# Patient Record
Sex: MALE | Race: WHITE | Employment: FULL TIME | URBAN - METROPOLITAN AREA
[De-identification: names, ages, dates, MRNs, and addresses within clinical notes are randomized per-mention and may not be internally consistent; named-entity substitution may affect disease eponyms.]

---

## 2017-01-06 RX ORDER — INSULIN GLARGINE 100 [IU]/ML
INJECTION, SOLUTION SUBCUTANEOUS
Qty: 2 EACH | Refills: 3 | Status: SHIPPED | OUTPATIENT
Start: 2017-01-06 | End: 2017-08-31 | Stop reason: SDUPTHER

## 2017-02-26 RX ORDER — BENAZEPRIL HYDROCHLORIDE 10 MG/1
TABLET ORAL
Qty: 90 TAB | Refills: 1 | Status: SHIPPED | OUTPATIENT
Start: 2017-02-26 | End: 2017-08-31 | Stop reason: SDUPTHER

## 2017-02-27 RX ORDER — BLOOD SUGAR DIAGNOSTIC
STRIP MISCELLANEOUS
Qty: 500 STRIP | Refills: 1 | Status: SHIPPED | OUTPATIENT
Start: 2017-02-27 | End: 2018-01-23 | Stop reason: SDUPTHER

## 2017-03-27 ENCOUNTER — OFFICE VISIT (OUTPATIENT)
Dept: INTERNAL MEDICINE CLINIC | Age: 42
End: 2017-03-27

## 2017-03-27 VITALS
DIASTOLIC BLOOD PRESSURE: 76 MMHG | BODY MASS INDEX: 38.92 KG/M2 | HEART RATE: 86 BPM | RESPIRATION RATE: 19 BRPM | HEIGHT: 67 IN | TEMPERATURE: 98.3 F | WEIGHT: 248 LBS | SYSTOLIC BLOOD PRESSURE: 138 MMHG | OXYGEN SATURATION: 98 %

## 2017-03-27 DIAGNOSIS — M25.571 ACUTE RIGHT ANKLE PAIN: ICD-10-CM

## 2017-03-27 RX ORDER — BISMUTH SUBSALICYLATE 262 MG
1 TABLET,CHEWABLE ORAL DAILY
COMMUNITY

## 2017-03-27 NOTE — MR AVS SNAPSHOT
Visit Information Date & Time Provider Department Dept. Phone Encounter #  
 3/27/2017  9:00 AM [facility]  Upcoming Health Maintenance Date Due HEMOGLOBIN A1C Q6M 6/27/2017 LIPID PANEL Q1 9/22/2017 FOOT EXAM Q1 12/9/2017 MICROALBUMIN Q1 12/27/2017 EYE EXAM RETINAL OR DILATED Q1 1/20/2018 DTaP/Tdap/Td series (2 - Td) 5/5/2018 Allergies as of 3/27/2017  Review Complete On: 3/27/2017 By: Leandrew Dakins, MD  
  
 Severity Noted Reaction Type Reactions Metformin  08/15/2014   Intolerance Nausea Only Current Immunizations  Reviewed on 9/4/2015 Name Date Influenza Vaccine (Quad) PF 9/4/2015, 12/23/2014 Influenza Vaccine PF 2/5/2014 Influenza Vaccine Split 10/23/2012, 10/21/2011, 11/1/2010 Influenza Vaccine Whole 10/30/2009, 10/24/2008 Pneumococcal Vaccine (Unspecified Type) 5/5/2008 TDAP Vaccine 5/5/2008 Not reviewed this visit You Were Diagnosed With   
  
 Codes Comments Uncontrolled type 2 diabetes mellitus without complication, with long-term current use of insulin ([facility] [address]    -  Primary ICD-10-CM: E11.65, Z79.4 ICD-9-CM: 250.02, V58.67 Acute right ankle pain     ICD-10-CM: M25.571 ICD-9-CM: 719.47, 338.19 Vitals BP Pulse Temp Resp Height(growth percentile) Weight(growth percentile) 138/76 (BP 1 Location: Right arm, BP Patient Position: Sitting) 86 98.3 °F (36.8 °C) (Oral) 19 5' 6.5\" (1.689 m) 248 lb (112.5 kg) SpO2 BMI Smoking Status 98% 39.43 kg/m2 Never Smoker BMI and BSA Data Body Mass Index Body Surface Area  
 39.43 kg/m 2 2.3 m 2 [id number] [address] [phone] Your Updated Medication List  
  
   
This list is accurate as of: 3/27/17  9:47 AM.  Always use your most recent med list.  
  
  
  
  
 atorvastatin 40 mg tablet Commonly known as:  LIPITOR  
TAKE 1 TABLET BY MOUTH EVERY DAY  
  
 benazepril 10 mg tablet Commonly known as:  LOTENSIN  
TAKE 1 TABLET BY MOUTH EVERY DAY  
  
 CLARITIN-D 24 HOUR PO Take  by mouth. insulin glargine 100 unit/mL (3 mL) pen Commonly known as:  BASAGLAR KWIKPEN  
52 Units subcutaneously daily  
  
 montelukast 10 mg tablet Commonly known as:  SINGULAIR  
TAKE 1 TABLET BY MOUTH EVERY DAY  
  
 multivitamin tablet Commonly known as:  ONE A DAY Take 1 Tab by mouth daily. NOVOFINE 30 30 gauge x 1/3\" Generic drug:  Insulin Needles (Disposable) USE TO INJECT INSULIN 4 TIMES DAILY. NovoLOG Flexpen 100 unit/mL Inpn Generic drug:  insulin aspart INJECT UP TO 8 UNITS 3 TIMES A DAY AS DIRECTED  
  
 ONETOUCH ULTRA TEST strip Generic drug:  glucose blood VI test strips TEST 3-5 TIMES DAILY AS DIRECTED PEPCID PO Take  by mouth. VITAMIN D3 1,000 unit tablet Generic drug:  cholecalciferol Take  by mouth daily. We Performed the Following HEMOGLOBIN A1C WITH EAG [72940 CPT(R)] Patient Instructions Ankle: Exercises Your Care Instructions Here are some examples of exercises for your ankle. Start each exercise slowly. Ease off the exercise if you start to have pain. Your doctor or physical therapist will tell you when you can start these exercises and which ones will work best for you. How to do the exercises \"Alphabet\" exercise 1. Trace the alphabet with your toe. This helps your ankle move in all directions. Side-to-side knee swing exercise 1. Sit in a chair with your foot flat on the floor. 2. Slowly move your knee from side to side while keeping your foot pressed flat. 3. Continue this exercise for 2 to 3 minutes. Towel curl 1. While sitting, place your foot on a towel on the floor and scrunch the towel toward you with your toes. 2. Then use your toes to push the towel away from you. 3. Make this exercise more challenging by placing a weighted object, such as a soup can, on the other end of the towel. Towel stretch 1. Sit with your legs extended and knees straight. 2. Place a towel around your foot just under the toes. 3. Hold each end of the towel in each hand, with your hands above your knees. 4. Pull back with the towel so that your foot stretches toward you. 5. Hold the position for at least 15 to 30 seconds. 6. Repeat 2 to 4 times a session, up to 5 sessions a day. Ankle eversion exercise 1. Start by sitting with your foot flat on the floor and pushing it outward against an immovable object such as the wall or heavy furniture. Hold for about 6 seconds, then relax. Repeat 8 to 12 times. 2. After you feel comfortable with this, try using rubber tubing looped around the outside of your feet for resistance. Push your foot out to the side against the tubing, and then count to 10 as you slowly bring your foot back to the middle. Repeat 8 to 12 times. Isometric opposition exercises 1. While sitting, put your feet together flat on the floor. 2. Press your injured foot inward against your other foot. Hold for about 6 seconds, and relax. Repeat 8 to 12 times. 3. Then place the heel of your other foot on top of the injured one. Push down with the top heel while trying to push up with your injured foot. Hold for about 6 seconds, and relax. Repeat 8 to 12 times. Follow-up care is a key part of your treatment and safety. Be sure to make and go to all appointments, and call your doctor if you are having problems. It's also a good idea to know your test results and keep a list of the medicines you take. Where can you learn more? Go to http://adele-keysha.info/. Enter M284 in the search box to learn more about \"Ankle: Exercises. \" Current as of: May 23, 2016 Content Version: 11.1 © 7271-8291 Healthwise, Incorporated. Care instructions adapted under license by [employer] (which disclaims liability or warranty for this information). If you have questions about a medical condition or this instruction, always ask your healthcare professional. [address] any warranty or liability for your use of this information. Introducing [facility] & HEALTH SERVICES! Dear Amadeo Aguilera: 
Thank you for requesting a [employer] account. Our records indicate that you already have an active [employer] account. You can access your account anytime at [email or web address] Did you know that you can access your hospital and ER discharge instructions at any time in [employer]? You can also review all of your test results from your hospital stay or ER visit. Additional Information If you have questions, please visit the Frequently Asked Questions section of the [employer] website at [email or web address] Remember, [employer] is NOT to be used for urgent needs. For medical emergencies, dial 911. Now available from your iPhone and Android! Please provide this summary of care documentation to your next provider. Your primary care clinician is listed as Antwan Styles. If you have any questions after today's visit, please call [phone].

## 2017-03-27 NOTE — PATIENT INSTRUCTIONS
Ankle: Exercises  Your Care Instructions  Here are some examples of exercises for your ankle. Start each exercise slowly. Ease off the exercise if you start to have pain. Your doctor or physical therapist will tell you when you can start these exercises and which ones will work best for you. How to do the exercises  \"Alphabet\" exercise    1. Trace the alphabet with your toe. This helps your ankle move in all directions. Side-to-side knee swing exercise    1. Sit in a chair with your foot flat on the floor. 2. Slowly move your knee from side to side while keeping your foot pressed flat. 3. Continue this exercise for 2 to 3 minutes. Towel curl    1. While sitting, place your foot on a towel on the floor and scrunch the towel toward you with your toes. 2. Then use your toes to push the towel away from you. 3. Make this exercise more challenging by placing a weighted object, such as a soup can, on the other end of the towel. Towel stretch    1. Sit with your legs extended and knees straight. 2. Place a towel around your foot just under the toes. 3. Hold each end of the towel in each hand, with your hands above your knees. 4. Pull back with the towel so that your foot stretches toward you. 5. Hold the position for at least 15 to 30 seconds. 6. Repeat 2 to 4 times a session, up to 5 sessions a day. Ankle eversion exercise    1. Start by sitting with your foot flat on the floor and pushing it outward against an immovable object such as the wall or heavy furniture. Hold for about 6 seconds, then relax. Repeat 8 to 12 times. 2. After you feel comfortable with this, try using rubber tubing looped around the outside of your feet for resistance. Push your foot out to the side against the tubing, and then count to 10 as you slowly bring your foot back to the middle. Repeat 8 to 12 times. Isometric opposition exercises    1. While sitting, put your feet together flat on the floor.   2. Press your injured foot inward against your other foot. Hold for about 6 seconds, and relax. Repeat 8 to 12 times. 3. Then place the heel of your other foot on top of the injured one. Push down with the top heel while trying to push up with your injured foot. Hold for about 6 seconds, and relax. Repeat 8 to 12 times. Follow-up care is a key part of your treatment and safety. Be sure to make and go to all appointments, and call your doctor if you are having problems. It's also a good idea to know your test results and keep a list of the medicines you take. Where can you learn more? Go to http://adele-keysha.info/. Enter P287 in the search box to learn more about \"Ankle: Exercises. \"  Current as of: May 23, 2016  Content Version: 11.1  © 5986-0833 [employer]. Care instructions adapted under license by [employer] (which disclaims liability or warranty for this information). If you have questions about a medical condition or this instruction, always ask your healthcare professional. [address] any warranty or liability for your use of this information.

## 2017-03-27 NOTE — PROGRESS NOTES
Follow Up Visit    Alfredito Tamayo is a 42 y.o. male. he presents for Diabetes and Ankle Pain  . Endocrine Review  He is seen for diabetes. Since last visit: he has been using Basalglar with good results. Home glucose monitoring: is performed regularly, fasting values range 160-180. He reports medication compliance: compliant all of the time. He reports the following medication side effects: none. Diabetic diet compliance: compliant most of the time. Further diabetic ROS: no unusual visual symptoms, no hypoglycemia. Lab review: most recent A1c was 8.2. Will recheck this. Ankle Pain  Patient complains of right ankle pain. Onset of the symptoms was 3 days ago. Inciting event: injured while running. Current symptoms include stiffness and noted to be mild. Aggravating symptoms: running. Patient's overall course: gradually improving. Patient has had no prior ankle problems. Previous visits for this problem: none. Evaluation to date: none. Treatment to date: OTC analgesics: effective. Patient Active Problem List   Diagnosis Code    [facility] L54.51    Hypertension, essential, benign I10    Hypercholesteremia E78.00    Allergic rhinitis J30.9    GERD (gastroesophageal reflux disease) K21.9         Prior to Admission medications    Medication Sig Start Date End Date Taking? Authorizing Provider   multivitamin (ONE A DAY) tablet Take 1 Tab by mouth daily. Yes Historical Provider   NOVOFINE 30 30 gauge x 1/3\" USE TO INJECT INSULIN 4 TIMES DAILY.  3/10/17  Yes Osito Klein MD   ONETOUCH ULTRA TEST strip TEST 3-5 TIMES DAILY AS DIRECTED 2/27/17  Yes Osito Klein MD   benazepril (LOTENSIN) 10 mg tablet TAKE 1 TABLET BY MOUTH EVERY DAY 2/26/17  Yes Osito Klein MD   atorvastatin (LIPITOR) 40 mg tablet TAKE 1 TABLET BY MOUTH EVERY DAY 1/31/17  Yes Osito Klein MD   montelukast (SINGULAIR) 10 mg tablet TAKE 1 TABLET BY MOUTH EVERY DAY 1/31/17  Yes Namita Dukes MD   insulin glargine [facility]) 100 unit/mL (3 mL) pen 52 Units subcutaneously daily 1/6/17  Yes Namita Dukes MD   NOVOLOG FLEXPEN 100 unit/mL inpn INJECT UP TO 8 UNITS 3 TIMES A DAY AS DIRECTED 9/14/16  Yes Namita Dukes MD   FAMOTIDINE (PEPCID PO) Take  by mouth. Yes Historical Provider   LORATADINE/PSEUDOEPHEDRINE (CLARITIN-D 24 HOUR PO) Take  by mouth. Yes Historical Provider   cholecalciferol, vitamin d3, (VITAMIN D) 1,000 unit tablet Take  by mouth daily. Yes Historical Provider         Health Maintenance   Topic Date Due    INFLUENZA AGE 9 TO ADULT  08/01/2016    HEMOGLOBIN A1C Q6M  06/27/2017    LIPID PANEL Q1  09/22/2017    FOOT EXAM Q1  12/09/2017    MICROALBUMIN Q1  12/27/2017    EYE EXAM RETINAL OR DILATED Q1  01/20/2018    DTaP/Tdap/Td series (2 - Td) 05/05/2018    Pneumococcal 19-64 Medium Risk  Completed       ROS        Visit Vitals    /76 (BP 1 Location: Right arm, BP Patient Position: Sitting)    Pulse 86    Temp 98.3 °F (36.8 °C) (Oral)    Resp 19    Ht 5' 6.5\" (1.689 m)    Wt 248 lb (112.5 kg)    SpO2 98%    BMI 39.43 kg/m2       Physical Exam   Constitutional: He appears well-developed and well-nourished. Cardiovascular: Normal rate and regular rhythm. Pulmonary/Chest: Effort normal and breath sounds normal.   Musculoskeletal: Normal range of motion. He exhibits no edema or tenderness. ASSESSMENT/PLAN    Sydnee Lopez was seen today for diabetes and ankle pain. Diagnoses and all orders for this visit:    Uncontrolled type 2 diabetes mellitus without complication, with long-term current use of insulin (HCC)  -     HEMOGLOBIN A1C WITH EAG    Acute right ankle pain- Reviewed the use of rest, icing the affected joint and compression along with elevation. The patient will also use NSAID's to aid in recovery. They will follow up if any worsening of symptoms or if no improvement.  The patient agrees with and understands the plan of care. All questions have been answered. Follow-up Disposition:  Return in about 3 months (around 6/27/2017) for Follow up Diabetes.

## 2017-03-28 LAB
EST. AVERAGE GLUCOSE BLD GHB EST-MCNC: 169 MG/DL
HBA1C MFR BLD: 7.5 % (ref 4.8–5.6)

## 2017-05-25 RX ORDER — INSULIN ASPART 100 [IU]/ML
INJECTION, SOLUTION INTRAVENOUS; SUBCUTANEOUS
Qty: 27 ADJUSTABLE DOSE PRE-FILLED PEN SYRINGE | Refills: 1 | Status: SHIPPED | OUTPATIENT
Start: 2017-05-25 | End: 2018-01-12 | Stop reason: SDUPTHER

## 2017-07-03 ENCOUNTER — OFFICE VISIT (OUTPATIENT)
Dept: INTERNAL MEDICINE CLINIC | Age: 42
End: 2017-07-03

## 2017-07-03 VITALS
TEMPERATURE: 98.3 F | WEIGHT: 247.6 LBS | OXYGEN SATURATION: 96 % | RESPIRATION RATE: 19 BRPM | DIASTOLIC BLOOD PRESSURE: 70 MMHG | SYSTOLIC BLOOD PRESSURE: 130 MMHG | BODY MASS INDEX: 38.86 KG/M2 | HEART RATE: 86 BPM | HEIGHT: 67 IN

## 2017-07-03 DIAGNOSIS — Z79.4 TYPE 2 DIABETES MELLITUS WITH HYPERGLYCEMIA, WITH LONG-TERM CURRENT USE OF INSULIN (HCC): Primary | ICD-10-CM

## 2017-07-03 DIAGNOSIS — E11.65 TYPE 2 DIABETES MELLITUS WITH HYPERGLYCEMIA, WITH LONG-TERM CURRENT USE OF INSULIN (HCC): Primary | ICD-10-CM

## 2017-07-03 LAB — HBA1C MFR BLD HPLC: 8.4 %

## 2017-07-03 NOTE — MR AVS SNAPSHOT
Visit Information Date & Time Provider Department Dept. Phone Encounter #  
 7/3/2017  8:15 AM [facility] [phone] [id number] Upcoming Health Maintenance Date Due INFLUENZA AGE 9 TO ADULT 8/1/2017 LIPID PANEL Q1 9/22/2017 HEMOGLOBIN A1C Q6M 9/27/2017 FOOT EXAM Q1 12/9/2017 MICROALBUMIN Q1 12/27/2017 EYE EXAM RETINAL OR DILATED Q1 1/20/2018 DTaP/Tdap/Td series (2 - Td) 5/5/2018 Allergies as of 7/3/2017  Review Complete On: 7/3/2017 By: Julian Kat MD  
  
 Severity Noted Reaction Type Reactions Metformin  08/15/2014   Intolerance Nausea Only Current Immunizations  Reviewed on 9/4/2015 Name Date Influenza Vaccine (Quad) PF 9/4/2015, 12/23/2014 Influenza Vaccine PF 2/5/2014 Influenza Vaccine Split 10/23/2012, 10/21/2011, 11/1/2010 Influenza Vaccine Whole 10/30/2009, 10/24/2008 Pneumococcal Vaccine (Unspecified Type) 5/5/2008 TDAP Vaccine 5/5/2008 Not reviewed this visit You Were Diagnosed With   
  
 Codes Comments Type 2 diabetes mellitus with hyperglycemia, with long-term current use of insulin (HCC)    -  Primary ICD-10-CM: E11.65, Z79.4 ICD-9-CM: 250.00, 790.29, V58.67 Vitals BP Pulse Temp Resp Height(growth percentile) Weight(growth percentile) 130/70 (BP 1 Location: Right arm, BP Patient Position: Sitting) 86 98.3 °F (36.8 °C) (Oral) 19 5' 6.5\" (1.689 m) 247 lb 9.6 oz (112.3 kg) SpO2 BMI Smoking Status 96% 39.36 kg/m2 Never Smoker BMI and BSA Data Body Mass Index Body Surface Area  
 39.36 kg/m 2 2.3 m 2 Preferred Pharmacy Pharmacy Name Phone [id number] [place] [address] [phone] Your Updated Medication List  
  
   
This list is accurate as of: 7/3/17  8:50 AM.  Always use your most recent med list.  
  
  
  
  
 atorvastatin 40 mg tablet Commonly known as:  LIPITOR  
TAKE 1 TABLET BY MOUTH EVERY DAY  
  
 benazepril 10 mg tablet Commonly known as:  LOTENSIN  
TAKE 1 TABLET BY MOUTH EVERY DAY  
  
 CLARITIN-D 24 HOUR PO Take  by mouth. insulin glargine 100 unit/mL (3 mL) Inpn Commonly known as:  BASAGLAR KWIKPEN  
52 Units subcutaneously daily  
  
 montelukast 10 mg tablet Commonly known as:  SINGULAIR  
TAKE 1 TABLET BY MOUTH EVERY DAY  
  
 multivitamin tablet Commonly known as:  ONE A DAY Take 1 Tab by mouth daily. NOVOFINE 30 30 gauge x 1/3\" Generic drug:  Insulin Needles (Disposable) USE TO INJECT INSULIN 4 TIMES DAILY. NovoLOG Flexpen 100 unit/mL Inpn Generic drug:  insulin aspart INJECT UP TO 8 UNITS 3 TIMES A DAY AS DIRECTED  
  
 ONETOUCH ULTRA TEST strip Generic drug:  glucose blood VI test strips TEST 3-5 TIMES DAILY AS DIRECTED PEPCID PO Take  by mouth. VITAMIN D3 1,000 unit tablet Generic drug:  cholecalciferol Take  by mouth daily. We Performed the Following AMB POC HEMOGLOBIN A1C [51206 CPT(R)] Patient Instructions Please continue your medications as you have. We will see you in Sept/Oct.   
 
 
 
 
  
Introducing [facility] & HE[facility] SERVICES! Dear Adrienne Media: 
Thank you for requesting a [employer] account. Our records indicate that you already have an active [employer] account. You can access your account anytime at [email or web address]. [email or web address] Technology Did you know that you can access your hospital and ER discharge instructions at any time in [employer]? You can also review all of your test results from your hospital stay or ER visit. Additional Information If you have questions, please visit the Frequently Asked Questions section of the [employer] website at [email or web address] Remember, [employer] is NOT to be used for urgent needs. For medical emergencies, dial 911. Now available from your iPhone and Android! Please provide this summary of care documentation to your next provider. Your primary care clinician is listed as Moira Palomo. If you have any questions after today's visit, please call [phone].

## 2017-07-03 NOTE — PROGRESS NOTES
Follow Up Visit    Ricki Barth is a 42 y.o. male. he presents for Diabetes    Endocrine Review  He is seen for diabetes. Since last visit: he has had some stress and has not been able to exercise as much (due to work). Home glucose monitoring: fasting values range in the 190's, non fasting values range 140-160. He reports medication compliance: compliant all of the time. He reports the following medication side effects: none. Diabetic diet compliance: compliant most of the time. Further diabetic ROS: no chest pain or dyspnea, no hypoglycemia, no medication side effects noted. Lab review: A1c today is 8.4. Patient Active Problem List   Diagnosis Code    Diabetes mellitus type 2, uncontrolled, without complications ([facility] S79.62    Hypertension, essential, benign I10    Hypercholesteremia E78.00    Allergic rhinitis J30.9    GERD (gastroesophageal reflux disease) K21.9         Prior to Admission medications    Medication Sig Start Date End Date Taking? Authorizing Provider   NOVOLOG FLEXPEN 100 unit/mL inpn INJECT UP TO 8 UNITS 3 TIMES A DAY AS DIRECTED 5/25/17  Yes Priscilla Cohen MD   NOVOFINE 30 30 gauge x 1/3\" USE TO INJECT INSULIN 4 TIMES DAILY. 3/10/17  Yes Priscilla Cohen MD   ONETOUCH ULTRA TEST strip TEST 3-5 TIMES DAILY AS DIRECTED 2/27/17  Yes Priscilla Cohen MD   benazepril (LOTENSIN) 10 mg tablet TAKE 1 TABLET BY MOUTH EVERY DAY 2/26/17  Yes Priscilla Cohen MD   atorvastatin (LIPITOR) 40 mg tablet TAKE 1 TABLET BY MOUTH EVERY DAY 1/31/17  Yes Priscilla Cohen MD   montelukast (SINGULAIR) 10 mg tablet TAKE 1 TABLET BY MOUTH EVERY DAY 1/31/17  Yes Priscilla Cohen MD   insulin glargine [facility] KWIKPEN) 100 unit/mL (3 mL) pen 52 Units subcutaneously daily 1/6/17  Yes Priscilla Cohen MD   FAMOTIDINE (PEPCID PO) Take  by mouth. Yes Historical Provider   LORATADINE/PSEUDOEPHEDRINE (CLARITIN-D 24 HOUR PO) Take  by mouth.    Yes Historical Provider   cholecalciferol, vitamin d3, (VITAMIN D) 1,000 unit tablet Take  by mouth daily. Yes Historical Provider   multivitamin (ONE A DAY) tablet Take 1 Tab by mouth daily. Historical Provider         Health Maintenance   Topic Date Due    INFLUENZA AGE 9 TO ADULT  08/01/2017    LIPID PANEL Q1  09/22/2017    HEMOGLOBIN A1C Q6M  09/27/2017    FOOT EXAM Q1  12/09/2017    MICROALBUMIN Q1  12/27/2017    EYE EXAM RETINAL OR DILATED Q1  01/20/2018    DTaP/Tdap/Td series (2 - Td) 05/05/2018    Pneumococcal 19-64 Medium Risk  Completed       Review of Systems   Constitutional: Negative. Respiratory: Negative. Cardiovascular: Negative. Visit Vitals    /70 (BP 1 Location: Right arm, BP Patient Position: Sitting)    Pulse 86    Temp 98.3 °F (36.8 °C) (Oral)    Resp 19    Ht 5' 6.5\" (1.689 m)    Wt 247 lb 9.6 oz (112.3 kg)    SpO2 96%    BMI 39.36 kg/m2       Physical Exam   Constitutional: He appears well-developed and well-nourished. Cardiovascular: Normal rate. Pulmonary/Chest: Effort normal and breath sounds normal.         ASSESSMENT/PLAN    Andrea Terry was seen today for diabetes. Diagnoses and all orders for this visit:    Type 2 diabetes mellitus with hyperglycemia, with long-term current use of insulin ([address]- Advised continuing healthy habits for now. No changes to medications due to his job stress decreasing and he is now able to exercise more. Sugars are improving per his report. We will repeat labs/A1c at his physical in Oct.   -     AMB POC HEMOGLOBIN J5Q  -     METABOLIC PANEL, COMPREHENSIVE  -     CBC WITH AUTOMATED DIFF  -     PROSTATE SPECIFIC AG  -     HEMOGLOBIN A1C WITH EAG  -     MICROALBUMIN, UR, RAND W/ MICROALBUMIN/CREA RATIO      Follow-up Disposition:  Return in about 3 months (around 10/3/2017).

## 2017-07-26 RX ORDER — MONTELUKAST SODIUM 10 MG/1
TABLET ORAL
Qty: 30 TAB | Refills: 2 | Status: SHIPPED | OUTPATIENT
Start: 2017-07-26 | End: 2017-10-23 | Stop reason: SDUPTHER

## 2017-07-26 RX ORDER — ATORVASTATIN CALCIUM 40 MG/1
TABLET, FILM COATED ORAL
Qty: 30 TAB | Refills: 2 | Status: SHIPPED | OUTPATIENT
Start: 2017-07-26 | End: 2017-10-23 | Stop reason: SDUPTHER

## 2017-08-31 ENCOUNTER — TELEPHONE (OUTPATIENT)
Dept: INTERNAL MEDICINE CLINIC | Age: 42
End: 2017-08-31

## 2017-08-31 RX ORDER — INSULIN GLARGINE 100 [IU]/ML
INJECTION, SOLUTION SUBCUTANEOUS
Qty: 10 PEN | Refills: 1 | Status: SHIPPED | OUTPATIENT
Start: 2017-08-31 | End: 2017-10-13 | Stop reason: DRUGHIGH

## 2017-08-31 RX ORDER — BENAZEPRIL HYDROCHLORIDE 10 MG/1
10 TABLET ORAL DAILY
Qty: 90 TAB | Refills: 0 | Status: SHIPPED | OUTPATIENT
Start: 2017-08-31 | End: 2017-08-31 | Stop reason: SDUPTHER

## 2017-08-31 RX ORDER — BENAZEPRIL HYDROCHLORIDE 10 MG/1
TABLET ORAL
Qty: 90 TAB | Refills: 1 | OUTPATIENT
Start: 2017-08-31

## 2017-08-31 RX ORDER — BENAZEPRIL HYDROCHLORIDE 10 MG/1
10 TABLET ORAL DAILY
Qty: 90 TAB | Refills: 0 | Status: SHIPPED | OUTPATIENT
Start: 2017-08-31 | End: 2017-11-28 | Stop reason: SDUPTHER

## 2017-08-31 RX ORDER — INSULIN GLARGINE 100 [IU]/ML
INJECTION, SOLUTION SUBCUTANEOUS
Refills: 3 | OUTPATIENT
Start: 2017-08-31

## 2017-10-11 LAB
ALBUMIN SERPL-MCNC: 4.4 G/DL (ref 3.5–5.5)
ALBUMIN/CREAT UR: 3.8 MG/G CREAT (ref 0–30)
ALBUMIN/GLOB SERPL: 2 {RATIO} (ref 1.2–2.2)
ALP SERPL-CCNC: 94 IU/L (ref 39–117)
ALT SERPL-CCNC: 30 IU/L (ref 0–44)
AST SERPL-CCNC: 24 IU/L (ref 0–40)
BASOPHILS # BLD AUTO: 0 X10E3/UL (ref 0–0.2)
BASOPHILS NFR BLD AUTO: 0 %
BILIRUB SERPL-MCNC: 0.5 MG/DL (ref 0–1.2)
BUN SERPL-MCNC: 15 MG/DL (ref 6–24)
BUN/CREAT SERPL: 14 (ref 9–20)
CALCIUM SERPL-MCNC: 9.3 MG/DL (ref 8.7–10.2)
CHLORIDE SERPL-SCNC: 99 MMOL/L (ref 96–106)
CO2 SERPL-SCNC: 26 MMOL/L (ref 18–29)
CREAT SERPL-MCNC: 1.07 MG/DL (ref 0.76–1.27)
CREAT UR-MCNC: 160.1 MG/DL
EOSINOPHIL # BLD AUTO: 0.1 X10E3/UL (ref 0–0.4)
EOSINOPHIL NFR BLD AUTO: 1 %
ERYTHROCYTE [DISTWIDTH] IN BLOOD BY AUTOMATED COUNT: 13.2 % (ref 12.3–15.4)
EST. AVERAGE GLUCOSE BLD GHB EST-MCNC: 194 MG/DL
GLOBULIN SER CALC-MCNC: 2.2 G/DL (ref 1.5–4.5)
GLUCOSE SERPL-MCNC: 168 MG/DL (ref 65–99)
HBA1C MFR BLD: 8.4 % (ref 4.8–5.6)
HCT VFR BLD AUTO: 44.2 % (ref 37.5–51)
HGB BLD-MCNC: 15.1 G/DL (ref 12.6–17.7)
IMM GRANULOCYTES # BLD: 0 X10E3/UL (ref 0–0.1)
IMM GRANULOCYTES NFR BLD: 0 %
LYMPHOCYTES # BLD AUTO: 2.1 X10E3/UL (ref 0.7–3.1)
LYMPHOCYTES NFR BLD AUTO: 31 %
MCH RBC QN AUTO: 29.6 PG (ref 26.6–33)
MCHC RBC AUTO-ENTMCNC: 34.2 G/DL (ref 31.5–35.7)
MCV RBC AUTO: 87 FL (ref 79–97)
MICROALBUMIN UR-MCNC: 6.1 UG/ML
MONOCYTES # BLD AUTO: 0.5 X10E3/UL (ref 0.1–0.9)
MONOCYTES NFR BLD AUTO: 8 %
NEUTROPHILS # BLD AUTO: 4 X10E3/UL (ref 1.4–7)
NEUTROPHILS NFR BLD AUTO: 60 %
PLATELET # BLD AUTO: 182 X10E3/UL (ref 150–379)
POTASSIUM SERPL-SCNC: 5.1 MMOL/L (ref 3.5–5.2)
PROT SERPL-MCNC: 6.6 G/DL (ref 6–8.5)
PSA SERPL-MCNC: 0.4 NG/ML (ref 0–4)
RBC # BLD AUTO: 5.1 X10E6/UL (ref 4.14–5.8)
SODIUM SERPL-SCNC: 139 MMOL/L (ref 134–144)
WBC # BLD AUTO: 6.7 X10E3/UL (ref 3.4–10.8)

## 2017-10-13 ENCOUNTER — OFFICE VISIT (OUTPATIENT)
Dept: INTERNAL MEDICINE CLINIC | Age: 42
End: 2017-10-13

## 2017-10-13 VITALS
RESPIRATION RATE: 20 BRPM | BODY MASS INDEX: 38.99 KG/M2 | HEART RATE: 87 BPM | DIASTOLIC BLOOD PRESSURE: 72 MMHG | HEIGHT: 67 IN | SYSTOLIC BLOOD PRESSURE: 110 MMHG | TEMPERATURE: 98.5 F | OXYGEN SATURATION: 98 % | WEIGHT: 248.4 LBS

## 2017-10-13 DIAGNOSIS — E78.00 HYPERCHOLESTEREMIA: ICD-10-CM

## 2017-10-13 DIAGNOSIS — I10 HYPERTENSION, ESSENTIAL, BENIGN: ICD-10-CM

## 2017-10-13 RX ORDER — INSULIN GLARGINE 100 [IU]/ML
55 INJECTION, SOLUTION SUBCUTANEOUS DAILY
Qty: 15 ML | Refills: 3 | Status: SHIPPED | OUTPATIENT
Start: 2017-10-13 | End: 2017-12-13 | Stop reason: SDUPTHER

## 2017-10-13 NOTE — MR AVS SNAPSHOT
Visit Information Date & Time Provider Department Dept. Phone Encounter #  
 10/13/2017  8:45 AM [facility] [phone] [id number] Follow-up Instructions Return in about 3 months (around 1/13/2018) for Follow up Diabetes. Upcoming Health Maintenance Date Due INFLUENZA AGE 9 TO ADULT 8/1/2017 LIPID PANEL Q1 9/22/2017 FOOT EXAM Q1 12/9/2017 EYE EXAM RETINAL OR DILATED Q1 1/20/2018 HEMOGLOBIN A1C Q6M 4/10/2018 DTaP/Tdap/Td series (2 - Td) 5/5/2018 MICROALBUMIN Q1 10/10/2018 Allergies as of 10/13/2017  Review Complete On: 10/13/2017 By: Elliott Carr MD  
  
 Severity Noted Reaction Type Reactions Metformin  08/15/2014   Intolerance Nausea Only Current Immunizations  Reviewed on 9/4/2015 Name Date Influenza Vaccine (Quad) PF 9/4/2015, 12/23/2014 Influenza Vaccine PF 2/5/2014 Influenza Vaccine Split 10/23/2012, 10/21/2011, 11/1/2010 Influenza Vaccine Whole 10/30/2009, 10/24/2008 TDAP Vaccine 5/5/2008 ZZZ-RETIRED (DO NOT USE) Pneumococcal Vaccine (Unspecified Type) 5/5/2008 Not reviewed this visit You Were Diagnosed With   
  
 Codes Comments Uncontrolled type 2 diabetes mellitus without complication, with long-term current use of insulin ([facility]    -  Primary ICD-10-CM: E11.65, Z79.4 ICD-9-CM: 250.02, V58.67 Hypertension, essential, benign     ICD-10-CM: I10 
ICD-9-CM: 401.1 Hypercholesteremia     ICD-10-CM: E78.00 ICD-9-CM: 272.0 Vitals BP Pulse Temp Resp Height(growth percentile) Weight(growth percentile) 110/72 (BP 1 Location: Right arm, BP Patient Position: Sitting) 87 98.5 °F (36.9 °C) (Oral) 20 5' 6.5\" (1.689 m) 248 lb 6.4 oz (112.7 kg) SpO2 BMI Smoking Status 98% 39.49 kg/m2 Never Smoker Vitals History BMI and BSA Data Body Mass Index Body Surface Area  
 39.49 kg/m 2 2.3 m 2 Preferred Pharmacy Pharmacy Name Phone [facility]/PHARMACY #[id number] [address] [phone] Your Updated Medication List  
  
   
This list is accurate as of: 10/13/17  9:33 AM.  Always use your most recent med list.  
  
  
  
  
 atorvastatin 40 mg tablet Commonly known as:  LIPITOR  
TAKE 1 TABLET BY MOUTH EVERY DAY  
  
 benazepril 10 mg tablet Commonly known as:  LOTENSIN Take 1 Tab by mouth daily. CLARITIN-D 24 HOUR PO Take  by mouth. insulin glargine 100 unit/mL (3 mL) Inpn Commonly known as:  BASAGLAR KWIKPEN  
55 Units by SubCUTAneous route daily. montelukast 10 mg tablet Commonly known as:  SINGULAIR  
TAKE 1 TABLET BY MOUTH EVERY DAY  
  
 multivitamin tablet Commonly known as:  ONE A DAY Take 1 Tab by mouth daily. NOVOFINE 30 30 gauge x 1/3\" Generic drug:  Insulin Needles (Disposable) USE TO INJECT INSULIN 4 TIMES DAILY. NovoLOG Flexpen 100 unit/mL Inpn Generic drug:  insulin aspart INJECT UP TO 8 UNITS 3 TIMES A DAY AS DIRECTED  
  
 ONETOUCH ULTRA TEST strip Generic drug:  glucose blood VI test strips TEST 3-5 TIMES DAILY AS DIRECTED PEPCID PO Take  by mouth. VITAMIN D3 1,000 unit tablet Generic drug:  cholecalciferol Take  by mouth daily. Prescriptions Sent to Pharmacy Refills  
 insulin glargine (BASAGLAR KWIKPEN) 100 unit/mL (3 mL) inpn 3 Si Units by SubCUTAneous route daily. Class: Normal  
 Pharmacy: [facility] #: [phone] Route: SubCUTAneous We Performed the Following LIPID PANEL [89146 CPT(R)] Follow-up Instructions Return in about 3 months (around 2018) for Follow up Diabetes. Introducing [facility] & HEALTH SERVICES! Dear Melanie Howard: 
Thank you for requesting a [employer] account.   Our records indicate that you already have an active [email or web address] account. You can access your account anytime at [email or web address] Did you know that you can access your hospital and ER discharge instructions at any time in [email or web address]? You can also review all of your test results from your hospital stay or ER visit. Additional Information If you have questions, please visit the Frequently Asked Questions section of the [email or web address] website at [email or web address] Remember, [email or web address] is NOT to be used for urgent needs. For medical emergencies, dial 911. Now available from your iPhone and Android! Please provide this summary of care documentation to your next provider. Your primary care clinician is listed as Etelvina Acuña. If you have any questions after today's visit, please call [phone].

## 2017-11-27 RX ORDER — BENAZEPRIL HYDROCHLORIDE 10 MG/1
TABLET ORAL
Qty: 90 TAB | Refills: 0 | OUTPATIENT
Start: 2017-11-27

## 2017-11-28 RX ORDER — BENAZEPRIL HYDROCHLORIDE 10 MG/1
10 TABLET ORAL DAILY
Qty: 90 TAB | Refills: 1 | Status: SHIPPED | OUTPATIENT
Start: 2017-11-28 | End: 2018-04-18 | Stop reason: SDUPTHER

## 2017-12-07 RX ORDER — INSULIN GLARGINE 100 [IU]/ML
INJECTION, SOLUTION SUBCUTANEOUS
Refills: 0 | OUTPATIENT
Start: 2017-12-07

## 2017-12-07 RX ORDER — INSULIN GLARGINE 100 [IU]/ML
INJECTION, SOLUTION SUBCUTANEOUS
Qty: 3 PEN | Refills: 3 | Status: SHIPPED | OUTPATIENT
Start: 2017-12-07 | End: 2018-02-16

## 2017-12-13 RX ORDER — INSULIN GLARGINE 100 [IU]/ML
55 INJECTION, SOLUTION SUBCUTANEOUS DAILY
Qty: 6 PEN | Refills: 3 | Status: SHIPPED | OUTPATIENT
Start: 2017-12-13 | End: 2018-01-12 | Stop reason: SDUPTHER

## 2018-01-12 RX ORDER — INSULIN GLARGINE 100 [IU]/ML
55 INJECTION, SOLUTION SUBCUTANEOUS DAILY
Qty: 17 PEN | Refills: 0 | Status: SHIPPED | OUTPATIENT
Start: 2018-01-12 | End: 2018-04-09 | Stop reason: SDUPTHER

## 2018-01-12 RX ORDER — INSULIN ASPART 100 [IU]/ML
INJECTION, SOLUTION INTRAVENOUS; SUBCUTANEOUS
Qty: 8 PEN | Refills: 0 | Status: SHIPPED | OUTPATIENT
Start: 2018-01-12 | End: 2018-04-09 | Stop reason: SDUPTHER

## 2018-01-17 LAB
CHOLEST SERPL-MCNC: 117 MG/DL (ref 100–199)
EST. AVERAGE GLUCOSE BLD GHB EST-MCNC: 206 MG/DL
HBA1C MFR BLD: 8.8 % (ref 4.8–5.6)
HDLC SERPL-MCNC: 45 MG/DL
LDLC SERPL CALC-MCNC: 53 MG/DL (ref 0–99)
TRIGL SERPL-MCNC: 97 MG/DL (ref 0–149)
VLDLC SERPL CALC-MCNC: 19 MG/DL (ref 5–40)

## 2018-01-19 ENCOUNTER — OFFICE VISIT (OUTPATIENT)
Dept: INTERNAL MEDICINE CLINIC | Age: 43
End: 2018-01-19

## 2018-01-19 VITALS
RESPIRATION RATE: 16 BRPM | TEMPERATURE: 98.5 F | WEIGHT: 250 LBS | OXYGEN SATURATION: 98 % | HEART RATE: 82 BPM | HEIGHT: 69 IN | DIASTOLIC BLOOD PRESSURE: 82 MMHG | BODY MASS INDEX: 37.03 KG/M2 | SYSTOLIC BLOOD PRESSURE: 110 MMHG

## 2018-01-19 DIAGNOSIS — I10 HYPERTENSION, ESSENTIAL, BENIGN: ICD-10-CM

## 2018-01-19 DIAGNOSIS — E78.00 HYPERCHOLESTEREMIA: ICD-10-CM

## 2018-01-19 RX ORDER — LEVOCETIRIZINE DIHYDROCHLORIDE 5 MG/1
TABLET, FILM COATED ORAL
COMMUNITY

## 2018-01-19 RX ORDER — CEPHALEXIN 500 MG/1
CAPSULE ORAL
Refills: 0 | COMMUNITY
Start: 2017-10-24 | End: 2018-01-19 | Stop reason: ALTCHOICE

## 2018-01-19 NOTE — PROGRESS NOTES
Follow Up Visit    Christine Sorto is a 36 y.o. male. he presents for Diabetes (3 month f/u, last A1C 10/13/17, lipid panel 1/16/18) and Hypertension (3 month f/u)    Endocrine Review  He is seen for diabetes. Since last visit: he has tried to eat better but has not had much improvement in his blood sugars. He had increased his basal insulin as well. Home glucose monitoring: is performed regularly. He reports medication compliance: compliant all of the time. He reports the following medication side effects: none. Diabetic diet compliance: compliant most of the time. Further diabetic ROS: no chest pain or dyspnea, no numbness, tingling or pain in extremities, no hypoglycemia. Lab review: most recent lipid panel reviewed, showing LDL result meets goal, labs reviewed and discussed with patient, A1c today is 8.8. Patient Active Problem List   Diagnosis Code    Diabetes mellitus type 2, uncontrolled, without complications ([facility] I19.95    Hypertension, essential, benign I10    Hypercholesteremia E78.00    Allergic rhinitis J30.9    GERD (gastroesophageal reflux disease) K21.9         Prior to Admission medications    Medication Sig Start Date End Date Taking? Authorizing Provider   levocetirizine (XYZAL) 5 mg tablet Take  by mouth. Yes Historical Provider   insulin glargine (BASAGLAR KWIKPEN) 100 unit/mL (3 mL) inpn 55 Units by SubCUTAneous route daily. 1/12/18  Yes Myriam Alex MD   insulin aspart (NOVOLOG FLEXPEN) 100 unit/mL inpn INJECT UP TO 8 UNITS 3 TIMES A DAY AS DIRECTED 1/12/18  Yes Myriam Alex MD   insulin glargine (LANTUS,BASAGLAR) 100 unit/mL (3 mL) inpn Inject 55 units daily 12/7/17  Yes Myriam Alex MD   benazepril (LOTENSIN) 10 mg tablet Take 1 Tab by mouth daily.  11/28/17  Yes Myriam Alex MD   atorvastatin (LIPITOR) 40 mg tablet TAKE 1 TABLET BY MOUTH EVERY DAY 10/23/17  Yes Myriam Alex MD   montelukast (SINGULAIR) 10 mg tablet TAKE 1 TABLET BY MOUTH EVERY DAY 10/23/17  Yes Maverick Evans MD   multivitamin (ONE A DAY) tablet Take 1 Tab by mouth daily. Yes Historical Provider   NOVOFINE 30 30 gauge x 1/3\" USE TO INJECT INSULIN 4 TIMES DAILY. 3/10/17  Yes Maverick Evans MD   ONETOUCH ULTRA TEST strip TEST 3-5 TIMES DAILY AS DIRECTED 2/27/17  Yes Maverick Evans MD   FAMOTIDINE (PEPCID PO) Take  by mouth. Yes Historical Provider   LORATADINE/PSEUDOEPHEDRINE (CLARITIN-D 24 HOUR PO) Take  by mouth. Yes Historical Provider   cholecalciferol, vitamin d3, (VITAMIN D) 1,000 unit tablet Take  by mouth daily. Yes Historical Provider         Health Maintenance   Topic Date Due    Influenza Age 5 to Adult  08/01/2017    FOOT EXAM Q1  12/09/2017    EYE EXAM RETINAL OR DILATED Q1  01/20/2018    DTaP/Tdap/Td series (2 - Td) 05/05/2018    HEMOGLOBIN A1C Q6M  07/16/2018    MICROALBUMIN Q1  10/10/2018    LIPID PANEL Q1  01/16/2019    Pneumococcal 19-64 Medium Risk  Completed       Review of Systems   Constitutional: Negative. Cardiovascular: Negative. Neurological: Negative. Visit Vitals    /82 (BP 1 Location: Right arm, BP Patient Position: Sitting)    Pulse 82    Temp 98.5 °F (36.9 °C) (Oral)    Resp 16    Ht 5' 9\" (1.753 m)    Wt 250 lb (113.4 kg)    SpO2 98%    BMI 36.92 kg/m2       Physical Exam   Constitutional: No distress. Cardiovascular: Normal rate and regular rhythm. Pulmonary/Chest: Effort normal and breath sounds normal.         ASSESSMENT/PLAN    Diagnoses and all orders for this visit:    1. Uncontrolled type 2 diabetes mellitus without complication, with long-term current use of insulin ([facility] - Given his persistently elevated sugars, we will refer to endocrine.   -     REFERRAL TO ENDOCRINOLOGY    2. Hypertension, essential, benign    3. Hypercholesteremia        Follow-up Disposition:  Return in about 3 months (around 4/19/2018).

## 2018-01-19 NOTE — PROGRESS NOTES
Chief Complaint   Patient presents with    Diabetes     3 month f/u, last A1C 10/13/17, lipid panel 1/16/18    Hypertension     3 month f/u       1. Have you been to the ER, urgent care clinic since your last visit? Hospitalized since your last visit? No    2. Have you seen or consulted any other health care providers outside of the [address] [place] [address] since your last visit? Include any pap smears or colon screening.  No

## 2018-02-16 ENCOUNTER — OFFICE VISIT (OUTPATIENT)
Dept: ENDOCRINOLOGY | Age: 43
End: 2018-02-16

## 2018-02-16 VITALS
SYSTOLIC BLOOD PRESSURE: 133 MMHG | HEIGHT: 69 IN | BODY MASS INDEX: 37.47 KG/M2 | DIASTOLIC BLOOD PRESSURE: 77 MMHG | WEIGHT: 253 LBS | HEART RATE: 75 BPM

## 2018-02-16 DIAGNOSIS — E78.00 HYPERCHOLESTEREMIA: ICD-10-CM

## 2018-02-16 DIAGNOSIS — I10 HYPERTENSION, ESSENTIAL, BENIGN: ICD-10-CM

## 2018-02-16 NOTE — PROGRESS NOTES
Chief Complaint   Patient presents with    Diabetes    New Patient     History of Present Illness: Jaida Guajardo is a 36 y.o. male presents for evaluation of diabetes. he has had diabetes since 2007. Most recent A1c was 8.8    Diabetes related complications:  No microalbuminuria  No retinopathy  No sx of neuropathy    Current diabetes regimen:  Novolog for meals and high glucoses. < 100 - 5  110-149: 6, then 1:50 mg/dl thereafter  Basaglar 55 units    Glucoses:  Checking before meals - 3 times a day. Values range from 100 - mid 200s  Values tend to be highest fasting and improve during the daytime    Diet:   Breakfast: two pieces of toast, peanut butter and jelly (sugar free)  - Lunch:  Salad from home. Almonds, carrots. If he does not take lunch - sandwich and fruit or chips. - Dinner: crab cakes with green beans, tater tots. Chocolate. Glass of wine. - Beverages: water and coffee    Exercise:   Exercises in AM - swims or runs in AM. 45 minutes. Social:    Has 15 yo daughter who likes to run. Also has 10 yo daughter. 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 Past Medical History:   Diagnosis Date    Allergic rhinitis     Borderline glaucoma, open angle with borderline findings 2014    Diabetes ([address]     DM type 1 (diabetes mellitus, type 1) (HCC)     GERD (gastroesophageal reflux disease)     HTN (hypertension)     Hypercholesteremia      Past Surgical History:   Procedure Laterality Date    HX ORTHOPAEDIC      Right foot surgery    HX WISDOM TEETH EXTRACTION       Current Outpatient Prescriptions   Medication Sig    glucose blood VI test strips (ONETOUCH ULTRA TEST) strip TEST 3-5 TIMES DAILY AS DIRECTED    Insulin Needles, Disposable, (NOVOFINE 30) 30 gauge x 1/3\" USE TO INJECT INSULIN 4 TIMES DAILY.  levocetirizine (XYZAL) 5 mg tablet Take  by mouth.  insulin glargine (BASAGLAR KWIKPEN) 100 unit/mL (3 mL) inpn 55 Units by SubCUTAneous route daily.     insulin aspart (NOVOLOG FLEXPEN) 100 unit/mL inpn INJECT UP TO 8 UNITS 3 TIMES A DAY AS DIRECTED    benazepril (LOTENSIN) 10 mg tablet Take 1 Tab by mouth daily.  atorvastatin (LIPITOR) 40 mg tablet TAKE 1 TABLET BY MOUTH EVERY DAY    montelukast (SINGULAIR) 10 mg tablet TAKE 1 TABLET BY MOUTH EVERY DAY    multivitamin (ONE A DAY) tablet Take 1 Tab by mouth daily.  FAMOTIDINE (PEPCID PO) Take  by mouth.  LORATADINE/PSEUDOEPHEDRINE (CLARITIN-D 24 HOUR PO) Take  by mouth.  cholecalciferol, vitamin d3, (VITAMIN D) 1,000 unit tablet Take  by mouth daily. No current facility-administered medications for this visit. Allergies   Allergen Reactions    Metformin Nausea Only     Family History   Problem Relation Age of Onset    Diabetes Mother     Heart Disease Mother      CABG 3    Arthritis-osteo Mother     Elevated Lipids Mother     Hypertension Mother     COPD Mother     Cancer Father      multiple myelom    Elevated Lipids Brother     Heart Disease Maternal Grandmother     Cancer Maternal Grandmother      bone cancer    Diabetes Paternal Grandmother     Hypertension Paternal Grandmother      Social History     Social History    Marital status:      Spouse name: N/A    Number of children: N/A    Years of education: N/A     Occupational History    Not on file.      Social History Main Topics    Smoking status: Never Smoker    Smokeless tobacco: Never Used    Alcohol use 1.5 oz/week     2 Glasses of wine, 1 Cans of beer per week    Drug use: No    Sexual activity: Yes     Partners: Female     Birth control/ protection: None     Other Topics Concern    Not on file     Social History Narrative     Review of Systems:  - Constitutional Symptoms: no fevers, chills, weight loss  - Eyes: no blurry vision or double vision  - Cardiovascular: no chest pain or palpitations  - Respiratory: no cough or shortness of breath  - Gastrointestinal: no dysphagia or abdominal pain  - Musculoskeletal: + aches with running, but no major joint problems  - Integumentary: no rashes  - Neurological: no numbness, tingling, or headaches  - Psychiatric: no depression or anxiety  - Endocrine: no heat or cold intolerance, no polyuria or polydipsia    Physical Examination:  Visit Vitals    /77    Pulse 75    Ht 5' 9\" (1.753 m)    Wt 253 lb (114.8 kg)    BMI 37.36 kg/m2   -   - General: pleasant, no distress,   - HEENT:No scleral/conjunctival injection, EOMI,MMM  - Cardiovascular: regular, normal rate  - Respiratory: normal effort  - Integumentary: no edema   Diabetic foot exam:     Right: Filament test normal sensation with micro filament   Pulse DP: 2+ (normal)   Deformities: None    - Neurological: alert and oriented  - Psychiatric: normal mood and affect    Data Reviewed:   Component      Latest Ref Rng & Units 1/16/2018 1/16/2018 10/10/2017           8:15 AM  8:15 AM  9:34 AM   Cholesterol, total      100 - 199 mg/dL 117     Triglyceride      0 - 149 mg/dL 97     HDL Cholesterol      >39 mg/dL 45     LDL, calculated      0 - 99 mg/dL 53     VLDL, calculated      5 - 40 mg/dL 19     CHOL/HDL Ratio      0 - 5.0        Creatinine, urine      Not Estab. mg/dL   160.1   Microalbumin, urine      Not Estab. ug/mL   6.1   Microalbumin/Creat. Ratio      0.0 - 30.0 mg/g creat   3.8   Hemoglobin A1c, (calculated)      4.8 - 5.6 %  8.8 (H)    Estimated average glucose      mg/dL  206      Assessment/Plan:   1. Uncontrolled type 2 diabetes mellitus without complication, with long-term current use of insulin (HCC)   - not controlled. - Reviewed pathology of type II diabetes, including insulin resistance and progressive loss of beta cell function and insulin production with time. Discussed how this is different than type I diabetes. Explained the role of weight loss and limiting carbohydrate intake in affecting insulin needs. Reviewed basal and prandial insulin needs.   Reviewed handout and gave basic directions on carbohydrate content of foods. Recommended limiting carbohydrate intake to < 45 g with meals. Encouraged exercise - 30 min 5 x weekly. - Trial of Saxenda. Reviewed titration  When dose reaches 1.2 mg, stop Novolog and lower Basaglar to 40 units. Reviewed how liraglutide works and how it is associated with nausea. Reviewed titration     2. Hypertension, essential, benign   - controlled. Continue benazepril    3. Hypercholesteremia   - continue atorvastatin  - encouraged wt loss   4. BMI 37.0-37.9, adult   - encouraged wt loss  - reviewed how weight loss would help improve weight related joint pains as well as improve energy. Patient Instructions   Diabetes. Watch carbohydrate intake with meals and aim to keep this less than 45 grams per meal.    A Mediterranean style diet with 55% or less of calories from carbohydrates has been shown to be very helpful for people with diabetes. This diet consists of vegetables, whole fruit, nuts, whole grains, beans, lentils, olive oil, fish, chicken, and less refined grains, red and processed meats. Exercise: work up to 30 minutes 5 days weekly of walking, or any other activity that gets your heart rate up. Make sure you are getting enough sleep - at least 7 hours/night. Medications:    Start Saxenda -  Start with 0.6 mg daily x 7 days. Increase to 1.2 mg dose in a week. When you increase to the 1.2 mg dose, this will lower your blood sugars some and may cause nausea. ** Stop Novolog when you start with 1.2 mg dose  Then, increase by 0.6 mg weekly, as tolerated until you are taking 3.0 mg or maximally tolerated dose    Basaglar - Decrease to 40 units when you increase Saxenda to 1.2 mg dose  - Follow trend from bedtime to fasting to assess          Follow-up Disposition:  Return in about 3 months (around 5/16/2018).

## 2018-02-16 NOTE — MR AVS SNAPSHOT
[address] [place] [address] 57 
[phone] Patient: Narcisa Lorenz MRN:  [id number] Visit Information Date & Time Provider Department Dept. Phone Encounter #  
 2/16/2018  8:50 AM Augie Tesfaye, [address] [place] [address] Diabetes and Endocrinology [phone] Follow-up Instructions Return in about 3 months (around 5/16/2018). Upcoming Health Maintenance Date Due Influenza Age 5 to Adult 8/1/2017 FOOT EXAM Q1 12/9/2017 EYE EXAM RETINAL OR DILATED Q1 1/20/2018 DTaP/Tdap/Td series (2 - Td) 5/5/2018 HEMOGLOBIN A1C Q6M 7/16/2018 MICROALBUMIN Q1 10/10/2018 LIPID PANEL Q1 1/16/2019 Allergies as of 2/16/2018  Review Complete On: 2/16/2018 By: Augie Tesfaye MD  
  
 Severity Noted Reaction Type Reactions Metformin  08/15/2014   Intolerance Nausea Only Current Immunizations  Reviewed on 9/4/2015 Name Date Influenza Vaccine (Quad) PF 9/4/2015, 12/23/2014 Influenza Vaccine PF 2/5/2014 Influenza Vaccine Split 10/23/2012, 10/21/2011, 11/1/2010 Influenza Vaccine Whole 10/30/2009, 10/24/2008 TDAP Vaccine 5/5/2008 ZZZ-RETIRED (DO NOT USE) Pneumococcal Vaccine (Unspecified Type) 5/5/2008 Not reviewed this visit You Were Diagnosed With   
  
 Codes Comments Uncontrolled type 2 diabetes mellitus without complication, with long-term current use of insulin ([facility]    -  Primary ICD-10-CM: E11.65, Z79.4 ICD-9-CM: 250.02, V58.67 Hypertension, essential, benign     ICD-10-CM: I10 
ICD-9-CM: 401.1 Hypercholesteremia     ICD-10-CM: E78.00 ICD-9-CM: 272.0 Vitals BP Pulse Height(growth percentile) Weight(growth percentile) BMI Smoking Status 133/77 75 5' 9\" (1.753 m) 253 lb (114.8 kg) 37.36 kg/m2 Never Smoker Vitals History BMI and BSA Data Body Mass Index Body Surface Area  
 37.36 kg/m 2 2.36 m 2 Preferred Pharmacy Pharmacy Name Phone [facility]/PHARMACY #[id number] Linda [address] [phone] Your Updated Medication List  
  
   
This list is accurate as of: 2/16/18 10:05 AM.  Always use your most recent med list.  
  
  
  
  
 atorvastatin 40 mg tablet Commonly known as:  LIPITOR  
TAKE 1 TABLET BY MOUTH EVERY DAY  
  
 benazepril 10 mg tablet Commonly known as:  LOTENSIN Take 1 Tab by mouth daily. CLARITIN-D 24 HOUR PO Take  by mouth. glucose blood VI test strips strip Commonly known as:  ONETOUCH ULTRA TEST  
TEST 3-5 TIMES DAILY AS DIRECTED  
  
 insulin aspart U-100 100 unit/mL Inpn Commonly known as:  NovoLOG Flexpen U-100 Insulin INJECT UP TO 8 UNITS 3 TIMES A DAY AS DIRECTED  
  
 insulin glargine 100 unit/mL (3 mL) Inpn Commonly known as:  BASAGLAR KWIKPEN U-100 INSULIN  
55 Units by SubCUTAneous route daily. Insulin Needles (Disposable) 30 gauge x 1/3\" Commonly known as:  NOVOFINE 30  
USE TO INJECT INSULIN 4 TIMES DAILY. * liraglutide 3 mg/0.5 mL (18 mg/3 mL) pen Commonly known as:  SAXENDA  
0.5 mL by SubCUTAneous route daily. * liraglutide 3 mg/0.5 mL (18 mg/3 mL) pen Commonly known as:  SAXENDA  
0.5 mL by SubCUTAneous route daily. montelukast 10 mg tablet Commonly known as:  SINGULAIR  
TAKE 1 TABLET BY MOUTH EVERY DAY  
  
 multivitamin tablet Commonly known as:  ONE A DAY Take 1 Tab by mouth daily. PEPCID PO Take  by mouth. VITAMIN D3 1,000 unit tablet Generic drug:  cholecalciferol Take  by mouth daily. XYZAL 5 mg tablet Generic drug:  levocetirizine Take  by mouth. * Notice: This list has 2 medication(s) that are the same as other medications prescribed for you. Read the directions carefully, and ask your doctor or other care provider to review them with you. Prescriptions Sent to Pharmacy Refills liraglutide (SAXENDA) 3 mg/0.5 mL (18 mg/3 mL) pen 11 Si.5 mL by SubCUTAneous route daily. Class: Normal  
 Pharmacy: [facility]  #: [phone] Route: SubCUTAneous Follow-up Instructions Return in about 3 months (around 2018). Patient Instructions Diabetes. Watch carbohydrate intake with meals and aim to keep this less than 45 grams per meal. 
 
A Mediterranean style diet with 55% or less of calories from carbohydrates has been shown to be very helpful for people with diabetes. This diet consists of vegetables, whole fruit, nuts, whole grains, beans, lentils, olive oil, fish, chicken, and less refined grains, red and processed meats. Exercise: work up to 30 minutes 5 days weekly of walking, or any other activity that gets your heart rate up. Make sure you are getting enough sleep - at least 7 hours/night. Medications: 
 
Start Saxenda -  Start with 0.6 mg daily x 7 days. Increase to 1.2 mg dose in a week. When you increase to the 1.2 mg dose, this will lower your blood sugars some and may cause nausea. ** Stop Novolog when you start with 1.2 mg dose Then, increase by 0.6 mg weekly, as tolerated until you are taking 3.0 mg or maximally tolerated dose Basaglar - Decrease to 40 units when you increase Saxenda to 1.2 mg dose - Follow trend from bedtime to fasting to assess Introducing [facility] & HEALTH SERVICES! Dear Ana Andujar: 
Thank you for requesting a Yesweplay account. Our records indicate that you already have an active Yesweplay account. You can access your account anytime at [email or web address] Did you know that you can access your hospital and ER discharge instructions at any time in Yesweplay? You can also review all of your test results from your hospital stay or ER visit. Additional Information If you have questions, please visit the Frequently Asked Questions section of the [email or web address] website at [email or web address] Remember, Traction is NOT to be used for urgent needs. For medical emergencies, dial 911. Now available from your iPhone and Android! Please provide this summary of care documentation to your next provider. Your primary care clinician is listed as Nathalia Maillard. If you have any questions after today's visit, please call [phone].

## 2018-02-16 NOTE — PATIENT INSTRUCTIONS
Diabetes. Watch carbohydrate intake with meals and aim to keep this less than 45 grams per meal.    A Mediterranean style diet with 55% or less of calories from carbohydrates has been shown to be very helpful for people with diabetes. This diet consists of vegetables, whole fruit, nuts, whole grains, beans, lentils, olive oil, fish, chicken, and less refined grains, red and processed meats. Exercise: work up to 30 minutes 5 days weekly of walking, or any other activity that gets your heart rate up. Make sure you are getting enough sleep - at least 7 hours/night. Medications:    Start Saxenda -  Start with 0.6 mg daily x 7 days. Increase to 1.2 mg dose in a week. When you increase to the 1.2 mg dose, this will lower your blood sugars some and may cause nausea.     ** Stop Novolog when you start with 1.2 mg dose  Then, increase by 0.6 mg weekly, as tolerated until you are taking 3.0 mg or maximally tolerated dose    Basaglar - Decrease to 40 units when you increase Saxenda to 1.2 mg dose  - Follow trend from bedtime to fasting to assess

## 2018-02-19 ENCOUNTER — DOCUMENTATION ONLY (OUTPATIENT)
Dept: INTERNAL MEDICINE CLINIC | Age: 43
End: 2018-02-19

## 2018-02-19 RX ORDER — MONTELUKAST SODIUM 10 MG/1
TABLET ORAL
Qty: 30 TAB | Refills: 3 | Status: SHIPPED | OUTPATIENT
Start: 2018-02-19 | End: 2018-06-24 | Stop reason: SDUPTHER

## 2018-02-19 RX ORDER — ATORVASTATIN CALCIUM 40 MG/1
TABLET, FILM COATED ORAL
Qty: 30 TAB | Refills: 3 | Status: SHIPPED | OUTPATIENT
Start: 2018-02-19 | End: 2018-06-24 | Stop reason: SDUPTHER

## 2018-04-28 LAB
BUN SERPL-MCNC: 12 MG/DL (ref 6–24)
BUN/CREAT SERPL: 12 (ref 9–20)
CALCIUM SERPL-MCNC: 9.2 MG/DL (ref 8.7–10.2)
CHLORIDE SERPL-SCNC: 101 MMOL/L (ref 96–106)
CO2 SERPL-SCNC: 22 MMOL/L (ref 18–29)
CREAT SERPL-MCNC: 0.99 MG/DL (ref 0.76–1.27)
EST. AVERAGE GLUCOSE BLD GHB EST-MCNC: 143 MG/DL
GFR SERPLBLD CREATININE-BSD FMLA CKD-EPI: 107 ML/MIN/1.73
GFR SERPLBLD CREATININE-BSD FMLA CKD-EPI: 93 ML/MIN/1.73
GLUCOSE SERPL-MCNC: 117 MG/DL (ref 65–99)
HBA1C MFR BLD: 6.6 % (ref 4.8–5.6)
POTASSIUM SERPL-SCNC: 4.6 MMOL/L (ref 3.5–5.2)
SODIUM SERPL-SCNC: 139 MMOL/L (ref 134–144)
TSH SERPL DL<=0.005 MIU/L-ACNC: 1.49 UIU/ML (ref 0.45–4.5)

## 2018-05-04 ENCOUNTER — OFFICE VISIT (OUTPATIENT)
Dept: ENDOCRINOLOGY | Age: 43
End: 2018-05-04

## 2018-05-04 VITALS
SYSTOLIC BLOOD PRESSURE: 110 MMHG | HEIGHT: 69 IN | RESPIRATION RATE: 18 BRPM | DIASTOLIC BLOOD PRESSURE: 81 MMHG | BODY MASS INDEX: 34.83 KG/M2 | WEIGHT: 235.2 LBS | OXYGEN SATURATION: 97 % | HEART RATE: 60 BPM

## 2018-05-04 DIAGNOSIS — E78.00 HYPERCHOLESTEREMIA: ICD-10-CM

## 2018-05-04 DIAGNOSIS — I10 HYPERTENSION, ESSENTIAL, BENIGN: ICD-10-CM

## 2018-05-04 DIAGNOSIS — E11.9 TYPE 2 DIABETES MELLITUS WITHOUT COMPLICATION, WITHOUT LONG-TERM CURRENT USE OF INSULIN (HCC): Primary | ICD-10-CM

## 2018-05-04 RX ORDER — METFORMIN HYDROCHLORIDE 500 MG/1
2000 TABLET, EXTENDED RELEASE ORAL
Qty: 120 TAB | Refills: 10 | Status: SHIPPED | OUTPATIENT
Start: 2018-05-04 | End: 2018-07-02 | Stop reason: SDUPTHER

## 2018-05-04 NOTE — PATIENT INSTRUCTIONS
Diabetes. Continue efforts with diet and exercise. Medications:  Continue Saxenda 3.0 mg daily    Decrease Basaglar to 35 units    Trial of metformin  mg - start with one tablet after dinner. If you tolerate that, then increase to 1 tablet after breakfast and 1 tablet after dinner  ** when you increase to 2 daily, decrease Basaglar to 25 units    Follow- trend from bedtime to fasting. If you have values < 90, then further decrease Basaglar in 5-10 unit increments. Stop benazepril - not needed.     Cholesterol:  - continue atorvastatin 40 mg

## 2018-05-04 NOTE — PROGRESS NOTES
History of Present Illness: Hilton Gomez is a 36 y.o. male presents for follow-up of diabetes. he has had diabetes since 2007. Most recent A1c was 6.6 with pre-labs, down from 8.8 in 1/2018    Diabetes related complications:  No microalbuminuria  No retinopathy  No sx of neuropathy    Current diabetes regimen:  Stopped Humalog  Started Saxenda and has successfully titrated to 3.0 mg. Nausea was noticeable, but manageable. Basaglar 40 units (down from 55 units)    Glucoses:  Checking before meals - 3 times a day. Values range from 100 - mid 200s  100-140 in AM  - likely avg 120-145. Stable later in the day. Glucoses highest after exercise - 160s. Lowest was 73. Followed exercise and lower carb intake. Diet:  Eating more fruits and vegetables  Less chocolate cravings. - apple or yogurt in AM. Portions are smaller. Exercise:   Exercises in AM - swims or runs in AM. 45 minutes. This has continued. Sleep - getting sleep now - getting closer to 7 hours. Wife says his snoring is less. HTN - takes benazepril - BP is substantially lower. Reports he had value of 862 systolic at work  Lipids and family hx of CAD-  Taking atorvastatin 40 mg. Social:    Has 15 yo daughter who likes to run. Also has 10 yo daughter. 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 Past Medical History:   Diagnosis Date    Allergic rhinitis     Borderline glaucoma, open angle with borderline findings 2014    Diabetes ([facility]     dx at age 34 in 36    DM type 1 (diabetes mellitus, type 1) ([facility] [address] [facility]     GERD (gastroesophageal reflux disease)     HTN (hypertension)     Hypercholesteremia      Current Outpatient Prescriptions   Medication Sig    BASAGLAR KWIKPEN U-100 INSULIN 100 unit/mL (3 mL) inpn INJECT 55 UNITS BY SUBCUTANEOUS ROUTE DAILY.  **CAN FILL 1/14    benazepril (LOTENSIN) 10 mg tablet TAKE 1 TABLET BY MOUTH EVERY DAY    liraglutide (SAXENDA) 3 mg/0.5 mL (18 mg/3 mL) pen 0.5 mL by SubCUTAneous route daily.  atorvastatin (LIPITOR) 40 mg tablet TAKE 1 TABLET BY MOUTH EVERY DAY    montelukast (SINGULAIR) 10 mg tablet TAKE 1 TABLET BY MOUTH EVERY DAY    glucose blood VI test strips (ONETOUCH ULTRA TEST) strip TEST 3-5 TIMES DAILY AS DIRECTED    Insulin Needles, Disposable, (NOVOFINE 30) 30 gauge x 1/3\" USE TO INJECT INSULIN 4 TIMES DAILY.  levocetirizine (XYZAL) 5 mg tablet Take  by mouth.  multivitamin (ONE A DAY) tablet Take 1 Tab by mouth daily.  FAMOTIDINE (PEPCID PO) Take  by mouth.  LORATADINE/PSEUDOEPHEDRINE (CLARITIN-D 24 HOUR PO) Take  by mouth.  cholecalciferol, vitamin d3, (VITAMIN D) 1,000 unit tablet Take  by mouth daily. No current facility-administered medications for this visit.       Allergies   Allergen Reactions    Metformin Nausea Only     Review of Systems:  - Eyes: no blurry vision or double vision  - Cardiovascular: no chest pain  - Respiratory: no shortness of breath  - Musculoskeletal: no myalgias  - Neurological: no numbness/tingling in extremities    Physical Examination:  Visit Vitals    /81 (BP 1 Location: Right arm, BP Patient Position: Sitting)    Pulse 60    Resp 18    Ht 5' 9\" (1.753 m)    Wt 235 lb 3.2 oz (106.7 kg)    SpO2 97%    BMI 34.73 kg/m2   -   - General: pleasant, no distress, normal gait   HEENT: hearing intact, EOMI, clear sclera without icterus  - Cardiovascular: regular, normal rate   - Respiratory: normal effort  - Integumentary: no edema  - Psychiatric: normal mood and affect    Data Reviewed:   Component      Latest Ref Rng & Units 4/27/2018 4/27/2018 4/27/2018 1/16/2018           8:55 AM  8:55 AM  8:55 AM  8:15 AM   Glucose      65 - 99 mg/dL 117 (H)      BUN      6 - 24 mg/dL 12      Creatinine      0.76 - 1.27 mg/dL 0.99      GFR est non-AA      >59 mL/min/1.73 93      GFR est AA      >59 mL/min/1.73 107      BUN/Creatinine ratio      9 - 20 12      Sodium      134 - 144 mmol/L 139      Potassium      3.5 - 5.2 mmol/L 4.6      Chloride      96 - 106 mmol/L 101      CO2      18 - 29 mmol/L 22      Calcium      8.7 - 10.2 mg/dL 9.2      Cholesterol, total      100 - 199 mg/dL    117   Triglyceride      0 - 149 mg/dL    97   HDL Cholesterol      >39 mg/dL    45   VLDL, calculated      5 - 40 mg/dL    19   LDL, calculated      0 - 99 mg/dL    53   Creatinine, urine      Not Estab. mg/dL       Microalbumin, urine      Not Estab. ug/mL       Microalbumin/Creat. Ratio      0.0 - 30.0 mg/g creat       Hemoglobin A1c, (calculated)      4.8 - 5.6 %  6.6 (H)     Estimated average glucose      mg/dL  143     TSH      0.450 - 4.500 uIU/mL   1.490      Component      Latest Ref Rng & Units 10/10/2017           9:34 AM   Glucose      65 - 99 mg/dL    BUN      6 - 24 mg/dL    Creatinine      0.76 - 1.27 mg/dL    GFR est non-AA      >59 mL/min/1.73    GFR est AA      >59 mL/min/1.73    BUN/Creatinine ratio      9 - 20    Sodium      134 - 144 mmol/L    Potassium      3.5 - 5.2 mmol/L    Chloride      96 - 106 mmol/L    CO2      18 - 29 mmol/L    Calcium      8.7 - 10.2 mg/dL    Cholesterol, total      100 - 199 mg/dL    Triglyceride      0 - 149 mg/dL    HDL Cholesterol      >39 mg/dL    VLDL, calculated      5 - 40 mg/dL    LDL, calculated      0 - 99 mg/dL    Creatinine, urine      Not Estab. mg/dL 160.1   Microalbumin, urine      Not Estab. ug/mL 6.1   Microalbumin/Creat. Ratio      0.0 - 30.0 mg/g creat 3.8   Hemoglobin A1c, (calculated)      4.8 - 5.6 %    Estimated average glucose      mg/dL    TSH      0.450 - 4.500 uIU/mL        Assessment/Plan:   1. Type 2 diabetes mellitus without complication, without long-term current use of insulin ([address]   - doing well with addition of Saxenda (liraglutide). - Will see if he can tolerate metformin ER. Will re-try this.  He was willing to do this as the details of his prior trial are unclear  - start metformin  mg after meal. Increase to twice daily after meals, if tolerating  - continue Lantus - lower doses to 35 units and lower to 25 units once he takes 2 metformins/day. Lower further for values < 90.    2. BMI 34.0-34.9,adult   - continue Saxenda and working to further decrease insulin. 3. Hypercholesteremia   - continue atorvastatin 40 mg. Has family hx of early CAD. May consider coronary calcium scoring assessment in the future   4. Hypertension, essential, benign   - appears benazepril is no longer needed with weight loss. Will have him discontinue     Patient Instructions   Diabetes. Continue efforts with diet and exercise. Medications:  Continue Saxenda 3.0 mg daily    Decrease Basaglar to 35 units    Trial of metformin  mg - start with one tablet after dinner. If you tolerate that, then increase to 1 tablet after breakfast and 1 tablet after dinner  ** when you increase to 2 daily, decrease Basaglar to 25 units    Follow- trend from bedtime to fasting. If you have values < 90, then further decrease Basaglar in 5-10 unit increments. Stop benazepril - not needed. Cholesterol:  - continue atorvastatin 40 mg      Follow-up Disposition:  Return in about 3 months (around 8/4/2018).     Copy sent to:

## 2018-05-04 NOTE — PROGRESS NOTES
Debi Aguilar is a 36 y.o. male         Chief Complaint   Patient presents with    Diabetes    Follow-up         1. Have you been to the ER, urgent care clinic since your last visit? Hospitalized since your last visit? No    2. Have you seen or consulted any other health care providers outside of the [facility] since your last visit? Include any pap smears or colon screening.  No

## 2018-05-04 NOTE — MR AVS SNAPSHOT
[address] 
[phone] Patient: Calixto Iglesias MRN:  [id number] Visit Information Date & Time Provider Department Dept. Phone Encounter #  
 5/4/2018  1:50 PM Tara Wilson, [address] [place] [address] Diabetes and Endocrinology [id number] Follow-up Instructions Return in about 3 months (around 8/4/2018). Upcoming Health Maintenance Date Due DTaP/Tdap/Td series (2 - Td) 5/5/2018 Influenza Age 5 to Adult 8/1/2018 MICROALBUMIN Q1 10/10/2018 HEMOGLOBIN A1C Q6M 10/27/2018 LIPID PANEL Q1 1/16/2019 FOOT EXAM Q1 2/16/2019 EYE EXAM RETINAL OR DILATED Q1 2/19/2019 Allergies as of 5/4/2018  Review Complete On: 5/4/2018 By: Katherine Bansal LPN Severity Noted Reaction Type Reactions Metformin  08/15/2014   Intolerance Nausea Only Current Immunizations  Reviewed on 9/4/2015 Name Date Influenza Vaccine (Quad) PF 9/4/2015, 12/23/2014 Influenza Vaccine PF 2/5/2014 Influenza Vaccine Split 10/23/2012, 10/21/2011, 11/1/2010 Influenza Vaccine Whole 10/30/2009, 10/24/2008 TDAP Vaccine 5/5/2008 ZZZ-RETIRED (DO NOT USE) Pneumococcal Vaccine (Unspecified Type) 5/5/2008 Not reviewed this visit You Were Diagnosed With   
  
 Codes Comments Type 2 diabetes mellitus without complication, without long-term current use of insulin (HCC)    -  Primary ICD-10-CM: E11.9 ICD-9-CM: 250.00 BMI 34.0-34.9,adult     ICD-10-CM: X06.62 
ICD-9-CM: V85.34 Hypercholesteremia     ICD-10-CM: E78.00 ICD-9-CM: 272.0 Vitals BP Pulse Resp Height(growth percentile) Weight(growth percentile) SpO2  
 110/81 (BP 1 Location: Right arm, BP Patient Position: Sitting) 60 18 5' 9\" (1.753 m) 235 lb 3.2 oz (106.7 kg) 97% BMI Smoking Status 34.73 kg/m2 Never Smoker Vitals History BMI and BSA Data Body Mass Index Body Surface Area 34.73 kg/m 2 2.28 m 2 Preferred Pharmacy Pharmacy Name Phone [facility][id number] - [address] [phone] Your Updated Medication List  
  
   
This list is accurate as of 5/4/18  2:51 PM.  Always use your most recent med list.  
  
  
  
  
 atorvastatin 40 mg tablet Commonly known as:  LIPITOR  
TAKE 1 TABLET BY MOUTH EVERY DAY  
  
 BASAGLAR KWIKPEN U-100 INSULIN 100 unit/mL (3 mL) Inpn Generic drug:  insulin glargine INJECT 55 UNITS BY SUBCUTANEOUS ROUTE DAILY. **CAN FILL 1/14 CLARITIN-D 24 HOUR PO Take  by mouth. glucose blood VI test strips strip Commonly known as:  ONETOUCH ULTRA TEST  
TEST 3-5 TIMES DAILY AS DIRECTED Insulin Needles (Disposable) 30 gauge x 1/3\" Commonly known as:  NOVOFINE 30  
USE TO INJECT INSULIN 4 TIMES DAILY. liraglutide 3 mg/0.5 mL (18 mg/3 mL) pen Commonly known as:  SAXENDA  
0.5 mL by SubCUTAneous route daily. metFORMIN  mg tablet Commonly known as:  GLUCOPHAGE XR Take 4 Tabs by mouth daily (with dinner). montelukast 10 mg tablet Commonly known as:  SINGULAIR  
TAKE 1 TABLET BY MOUTH EVERY DAY  
  
 multivitamin tablet Commonly known as:  ONE A DAY Take 1 Tab by mouth daily. PEPCID PO Take  by mouth. VITAMIN D3 1,000 unit tablet Generic drug:  cholecalciferol Take  by mouth daily. XYZAL 5 mg tablet Generic drug:  levocetirizine Take  by mouth. Prescriptions Sent to Pharmacy Refills  
 metFORMIN ER (GLUCOPHAGE XR) 500 mg tablet 10 Sig: Take 4 Tabs by mouth daily (with dinner). Class: Normal  
 Pharmacy: [facility] #: [phone] Route: Oral  
  
We Performed the Following HEMOGLOBIN A1C WITH EAG [07122 CPT(R)] LIPID PANEL [28825 CPT(R)] METABOLIC PANEL, COMPREHENSIVE [73587 CPT(R)] MICROALBUMIN, UR, RAND W/ MICROALB/CREAT RATIO [id number] CPT(R)] Follow-up Instructions Return in about 3 months (around 8/4/2018). Patient Instructions Diabetes. Continue efforts with diet and exercise. Medications: 
Continue Saxenda 3.0 mg daily Decrease Basaglar to 35 units Trial of metformin  mg - start with one tablet after dinner. If you tolerate that, then increase to 1 tablet after breakfast and 1 tablet after dinner ** when you increase to 2 daily, decrease Basaglar to 25 units Follow- trend from bedtime to fasting. If you have values < 90, then further decrease Basaglar in 5-10 unit increments. Stop benazepril - not needed. Cholesterol: 
- continue atorvastatin 40 mg Introducing [facility]! Dear Miranda Downing: 
Thank you for requesting a [employer] account. Our records indicate that you already have an active [employer] account. You can access your account anytime at [email or web address] Did you know that you can access your hospital and ER discharge instructions at any time in [employer]? You can also review all of your test results from your hospital stay or ER visit. Additional Information If you have questions, please visit the Frequently Asked Questions section of the [employer] website at [email or web address] Remember, [employer] is NOT to be used for urgent needs. For medical emergencies, dial 911. Now available from your iPhone and Android! Please provide this summary of care documentation to your next provider. Your primary care clinician is listed as Bertha Donaldson. If you have any questions after today's visit, please call [phone].

## 2018-06-26 RX ORDER — MONTELUKAST SODIUM 10 MG/1
TABLET ORAL
Qty: 30 TAB | Refills: 3 | Status: SHIPPED | OUTPATIENT
Start: 2018-06-26 | End: 2018-10-21 | Stop reason: SDUPTHER

## 2018-06-26 RX ORDER — ATORVASTATIN CALCIUM 40 MG/1
TABLET, FILM COATED ORAL
Qty: 30 TAB | Refills: 3 | Status: SHIPPED | OUTPATIENT
Start: 2018-06-26 | End: 2018-08-10 | Stop reason: DRUGHIGH

## 2018-07-02 RX ORDER — METFORMIN HYDROCHLORIDE 500 MG/1
2000 TABLET, EXTENDED RELEASE ORAL
Qty: 120 TAB | Refills: 10 | Status: SHIPPED | OUTPATIENT
Start: 2018-07-02 | End: 2018-07-03 | Stop reason: SDUPTHER

## 2018-07-03 RX ORDER — METFORMIN HYDROCHLORIDE 500 MG/1
2000 TABLET, EXTENDED RELEASE ORAL
Qty: 360 TAB | Refills: 3 | Status: SHIPPED | OUTPATIENT
Start: 2018-07-03 | End: 2019-07-04 | Stop reason: SDUPTHER

## 2018-07-24 ENCOUNTER — TELEPHONE (OUTPATIENT)
Dept: INTERNAL MEDICINE CLINIC | Age: 43
End: 2018-07-24

## 2018-08-07 LAB
ALBUMIN SERPL-MCNC: 4.5 G/DL (ref 3.5–5.5)
ALBUMIN/CREAT UR: 2 MG/G CREAT (ref 0–30)
ALBUMIN/GLOB SERPL: 2 {RATIO} (ref 1.2–2.2)
ALP SERPL-CCNC: 91 IU/L (ref 39–117)
ALT SERPL-CCNC: 30 IU/L (ref 0–44)
AST SERPL-CCNC: 22 IU/L (ref 0–40)
BILIRUB SERPL-MCNC: 0.4 MG/DL (ref 0–1.2)
BUN SERPL-MCNC: 19 MG/DL (ref 6–24)
BUN/CREAT SERPL: 18 (ref 9–20)
CALCIUM SERPL-MCNC: 9.6 MG/DL (ref 8.7–10.2)
CHLORIDE SERPL-SCNC: 99 MMOL/L (ref 96–106)
CHOLEST SERPL-MCNC: 88 MG/DL (ref 100–199)
CO2 SERPL-SCNC: 23 MMOL/L (ref 20–29)
CREAT SERPL-MCNC: 1.03 MG/DL (ref 0.76–1.27)
CREAT UR-MCNC: 156 MG/DL
EST. AVERAGE GLUCOSE BLD GHB EST-MCNC: 120 MG/DL
GLOBULIN SER CALC-MCNC: 2.2 G/DL (ref 1.5–4.5)
GLUCOSE SERPL-MCNC: 102 MG/DL (ref 65–99)
HBA1C MFR BLD: 5.8 % (ref 4.8–5.6)
HDLC SERPL-MCNC: 41 MG/DL
LDLC SERPL CALC-MCNC: 33 MG/DL (ref 0–99)
MICROALBUMIN UR-MCNC: 3.1 UG/ML
POTASSIUM SERPL-SCNC: 4.5 MMOL/L (ref 3.5–5.2)
PROT SERPL-MCNC: 6.7 G/DL (ref 6–8.5)
SODIUM SERPL-SCNC: 139 MMOL/L (ref 134–144)
TRIGL SERPL-MCNC: 70 MG/DL (ref 0–149)
VLDLC SERPL CALC-MCNC: 14 MG/DL (ref 5–40)

## 2018-08-10 ENCOUNTER — OFFICE VISIT (OUTPATIENT)
Dept: ENDOCRINOLOGY | Age: 43
End: 2018-08-10

## 2018-08-10 VITALS
DIASTOLIC BLOOD PRESSURE: 67 MMHG | HEIGHT: 69 IN | RESPIRATION RATE: 18 BRPM | OXYGEN SATURATION: 97 % | WEIGHT: 211.4 LBS | SYSTOLIC BLOOD PRESSURE: 106 MMHG | BODY MASS INDEX: 31.31 KG/M2 | HEART RATE: 83 BPM

## 2018-08-10 DIAGNOSIS — E11.9 TYPE 2 DIABETES MELLITUS WITHOUT COMPLICATION, WITH LONG-TERM CURRENT USE OF INSULIN (HCC): Primary | ICD-10-CM

## 2018-08-10 DIAGNOSIS — Z79.4 TYPE 2 DIABETES MELLITUS WITHOUT COMPLICATION, WITH LONG-TERM CURRENT USE OF INSULIN (HCC): Primary | ICD-10-CM

## 2018-08-10 DIAGNOSIS — Z82.49 FAMILY HISTORY OF EARLY CAD: ICD-10-CM

## 2018-08-10 RX ORDER — ATORVASTATIN CALCIUM 10 MG/1
10 TABLET, FILM COATED ORAL DAILY
Qty: 30 TAB | Refills: 11 | Status: SHIPPED | OUTPATIENT
Start: 2018-08-10 | End: 2019-06-16 | Stop reason: SDUPTHER

## 2018-08-10 NOTE — PROGRESS NOTES
History of Present Illness: Adolph Wilkes is a 39 y.o. male presents for follow-up of diabetes. he has had diabetes since 2007. Most recent A1c was 5.8 with pre-labs. Diabetes related complications:  No microalbuminuria  No retinopathy  No sx of neuropathy    Current diabetes regimen:  Saxenda 3.0 mg daily  Basaglar 25 units  Metformin ER 1000 mg twice daily    At initial visit he was taking 55 units of Basaglar and 6 units or more with each meal of NovoLog. He has lost approximately 45 pounds in the last 6 months. Glucoses:  Glucoses increase after exercising  Morning values 100-110. Values at other times can be as low as 80s. Diet:  He feels his portions are definitely lower. He is choosing his foods more wisely. Exercise:   Swims every other day  Plans to start running in near future. Sleep -reports sleeping well. This is improved with weight loss    HTN -stopped benazepril after last visit  Lipids and family hx of CAD-  Taking atorvastatin 40 mg.  Reviewed that LDL is now at 35    Social:    Has 15 yo daughter who likes to run. 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  metFORMIN ER (GLUCOPHAGE XR) 500 mg tablet Take 4 Tabs by mouth daily (with dinner).     atorvastatin (LIPITOR) 40 mg tablet TAKE 1 TABLET BY MOUTH EVERY DAY    montelukast (SINGULAIR) 10 mg tablet TAKE 1 TABLET BY MOUTH EVERY DAY    BASAGLAR KWIKPEN U-100 INSULIN 100 unit/mL (3 mL) inpn INJECT 55 UNITS BY SUBCUTANEOUS ROUTE DAILY. **CAN FILL 1/14    liraglutide (SAXENDA) 3 mg/0.5 mL (18 mg/3 mL) pen 0.5 mL by SubCUTAneous route daily.  glucose blood VI test strips (ONETOUCH ULTRA TEST) strip TEST 3-5 TIMES DAILY AS DIRECTED    levocetirizine (XYZAL) 5 mg tablet Take  by mouth.  multivitamin (ONE A DAY) tablet Take 1 Tab by mouth daily.  FAMOTIDINE (PEPCID PO) Take  by mouth nightly.  LORATADINE/PSEUDOEPHEDRINE (CLARITIN-D 24 HOUR PO) Take  by mouth.  cholecalciferol, vitamin d3, (VITAMIN D) 1,000 unit tablet Take  by mouth daily. No current facility-administered medications for this visit.       Allergies   Allergen Reactions    Metformin Nausea Only       Review of Systems:  - Eyes: no blurry vision or double vision  - Cardiovascular: no chest pain  - Respiratory: no shortness of breath  - Musculoskeletal: no myalgias  - Neurological: no numbness/tingling in extremities    Physical Examination:  Visit Vitals    /67 (BP 1 Location: Left arm, BP Patient Position: Sitting)    Pulse 83    Resp 18    Ht 5' 9\" (1.753 m)    Wt 211 lb 6.4 oz (95.9 kg)    SpO2 97%    BMI 31.22 kg/m2   -   - General: pleasant, no distress, normal gait   HEENT: hearing intact, EOMI, clear sclera without icterus  - Cardiovascular: regular, normal rate   - Respiratory: normal effort  - Integumentary: no edema  - Psychiatric: normal mood and affect    Data Reviewed:   Component      Latest Ref Rng & Units 8/6/2018 8/6/2018 8/6/2018 8/6/2018           9:18 AM  9:18 AM  9:18 AM  9:18 AM   Glucose      65 - 99 mg/dL 102 (H)      BUN      6 - 24 mg/dL 19      Creatinine      0.76 - 1.27 mg/dL 1.03      GFR est non-AA      >59 mL/min/1.73 88      GFR est AA      >59 mL/min/1.73 102      BUN/Creatinine ratio      9 - 20 18      Sodium      134 - 144 mmol/L 139      Potassium      3.5 - 5.2 mmol/L 4.5      Chloride      96 - 106 mmol/L 99      CO2      20 - 29 mmol/L 23      Calcium      8.7 - 10.2 mg/dL 9.6 Protein, total      6.0 - 8.5 g/dL 6.7      Albumin      3.5 - 5.5 g/dL 4.5      GLOBULIN, TOTAL      1.5 - 4.5 g/dL 2.2      A-G Ratio      1.2 - 2.2 2.0      Bilirubin, total      0.0 - 1.2 mg/dL 0.4      Alk. phosphatase      39 - 117 IU/L 91      AST      0 - 40 IU/L 22      ALT (SGPT)      0 - 44 IU/L 30      Cholesterol, total      100 - 199 mg/dL  88 (L)     Triglyceride      0 - 149 mg/dL  70     HDL Cholesterol      >39 mg/dL  41     VLDL, calculated      5 - 40 mg/dL  14     LDL, calculated      0 - 99 mg/dL  33     Creatinine, urine      Not Estab. mg/dL   156.0    Microalbumin, urine      Not Estab. ug/mL   3.1    Microalbumin/Creat. Ratio      0.0 - 30.0 mg/g creat   2.0    Hemoglobin A1c, (calculated)      4.8 - 5.6 %    5.8 (H)   Estimated average glucose      mg/dL    120       Assessment/Plan:   1. Type 2 diabetes mellitus without complication, with long-term current use of insulin (HCC)   Control is near, or at goal  Decreased Basaglar from 25-20 units, to decrease risk for hypoglycemia. Advised him to decrease further and increments of 5 units until he is no longer taking this, if glucoses are less than 90 at sometimes  Continue metformin  Discussed changing Saxenda to once weekly Ozempic. Feel that the [address] is likely more efficacious based on a comparative study. Start with 0.5 mg weekly and then increase to 1.0 mg weekly if tolerated. 2. Family history of early CAD   Continue aggressive efforts with blood pressure and lipid control. 3. BMI 31.0-31.9,adult   He is having success with weight loss by decreasing insulin and has responded well to the GLP-1 agonist.  Encouraged continued efforts. Patient Instructions   Diabetes. Continue efforts with diet and exercise.      Medications:  Continue Saxenda 3.0 mg daily  ** Consider changing Saxend to Ozemic 0.5 weekly x 4 weeks and then increase to 1.0 mg weekly    Continue metformin  mg x 2 twice daily    Decrease Basaglar to 20 units and drop dose in 5 unit increments weekly if you see values < 90 at any time. Cholesterol:  - Decrease atorvastatin to 10 mg daily. Follow-up Disposition:  Return in about 4 months (around 12/10/2018).     Copy sent to:

## 2018-08-10 NOTE — PATIENT INSTRUCTIONS
Diabetes. Continue efforts with diet and exercise. Medications:  Continue Saxenda 3.0 mg daily  ** Consider changing Saxend to Ozemic 0.5 weekly x 4 weeks and then increase to 1.0 mg weekly    Continue metformin  mg x 2 twice daily    Decrease Basaglar to 20 units and drop dose in 5 unit increments weekly if you see values < 90 at any time. Cholesterol:  - Decrease atorvastatin to 10 mg daily.

## 2018-08-10 NOTE — MR AVS SNAPSHOT
[address] 
[phone] Patient: Geovanna Gabriel MRN:  [id number] Visit Information Date & Time Provider Department Dept. Phone Encounter #  
 8/10/2018  2:10 PM Barbara Prado, [address] [place] [address] Diabetes and Endocrinology  [id number] Follow-up Instructions Return in about 4 months (around 12/10/2018). Upcoming Health Maintenance Date Due DTaP/Tdap/Td series (2 - Td) 5/5/2018 Influenza Age 5 to Adult 8/1/2018 HEMOGLOBIN A1C Q6M 2/6/2019 FOOT EXAM Q1 2/16/2019 EYE EXAM RETINAL OR DILATED Q1 2/19/2019 MICROALBUMIN Q1 8/6/2019 LIPID PANEL Q1 8/6/2019 Allergies as of 8/10/2018  Review Complete On: 8/10/2018 By: Barbara Prado MD  
  
 Severity Noted Reaction Type Reactions Metformin  08/15/2014   Intolerance Nausea Only Current Immunizations  Reviewed on 9/4/2015 Name Date Influenza Vaccine (Quad) PF 9/4/2015, 12/23/2014 Influenza Vaccine PF 2/5/2014 Influenza Vaccine Split 10/23/2012, 10/21/2011, 11/1/2010 Influenza Vaccine Whole 10/30/2009, 10/24/2008 TDAP Vaccine 5/5/2008 ZZZ-RETIRED (DO NOT USE) Pneumococcal Vaccine (Unspecified Type) 5/5/2008 Not reviewed this visit You Were Diagnosed With   
  
 Codes Comments Type 2 diabetes mellitus without complication, with long-term current use of insulin (HCC)    -  Primary ICD-10-CM: E11.9, Z79.4 ICD-9-CM: 250.00, V58.67 Family history of early CAD     ICD-10-CM: Z82.49 
ICD-9-CM: V17.3 BMI 31.0-31.9,adult     ICD-10-CM: Z68.31 
ICD-9-CM: V85.31 Vitals BP Pulse Resp Height(growth percentile) Weight(growth percentile) SpO2  
 106/67 (BP 1 Location: Left arm, BP Patient Position: Sitting) 83 18 5' 9\" (1.753 m) 211 lb 6.4 oz (95.9 kg) 97% BMI Smoking Status 31.22 kg/m2 Never Smoker Vitals History BMI and BSA Data Body Mass Index Body Surface Area  
 31.22 kg/m 2 2.16 m 2 Preferred Pharmacy Pharmacy Name Phone [facility][id number] [address] [phone] Your Updated Medication List  
  
   
This list is accurate as of 8/10/18  3:31 PM.  Always use your most recent med list.  
  
  
  
  
 atorvastatin 10 mg tablet Commonly known as:  LIPITOR Take 1 Tab by mouth daily. BASAGLAR KWIKPEN U-100 INSULIN 100 unit/mL (3 mL) Inpn Generic drug:  insulin glargine INJECT 55 UNITS BY SUBCUTANEOUS ROUTE DAILY. **CAN FILL 1/14 CLARITIN-D 24 HOUR PO Take  by mouth. glucose blood VI test strips strip Commonly known as:  ONETOUCH ULTRA TEST  
TEST 3-5 TIMES DAILY AS DIRECTED Insulin Needles (Disposable) 30 gauge x 1/3\" Commonly known as:  NOVOFINE 30  
USE TO INJECT INSULIN 4 TIMES DAILY. metFORMIN  mg tablet Commonly known as:  GLUCOPHAGE XR Take 4 Tabs by mouth daily (with dinner). montelukast 10 mg tablet Commonly known as:  SINGULAIR  
TAKE 1 TABLET BY MOUTH EVERY DAY  
  
 multivitamin tablet Commonly known as:  ONE A DAY Take 1 Tab by mouth daily. PEPCID PO Take  by mouth nightly. * semaglutide 0.25 mg/0.2 mL (2 mg/1.5 mL) sub-q pen Commonly known as:  OZEMPIC  
0.5 mg by SubCUTAneous route every seven (7) days. * semaglutide 1 mg/0.75 mL (2 mg/1.5 mL) sub-q pen Commonly known as:  OZEMPIC  
1 mg by SubCUTAneous route every seven (7) days. VITAMIN D3 1,000 unit tablet Generic drug:  cholecalciferol Take  by mouth daily. XYZAL 5 mg tablet Generic drug:  levocetirizine Take  by mouth. * Notice: This list has 2 medication(s) that are the same as other medications prescribed for you. Read the directions carefully, and ask your doctor or other care provider to review them with you. Prescriptions Printed Refills semaglutide (OZEMPIC) 1 mg/0.75 mL (2 mg/1.5 mL) sub-q pen 11 Si mg by SubCUTAneous route every seven (7) days. Class: Print Route: SubCUTAneous Prescriptions Sent to Pharmacy Refills  
 atorvastatin (LIPITOR) 10 mg tablet 11 Sig: Take 1 Tab by mouth daily. Class: Normal  
 Pharmacy: [facility] #: [phone] Route: Oral  
  
We Performed the Following HEMOGLOBIN A1C WITH EAG [02346 CPT(R)] LIPID PANEL [28916 CPT(R)] METABOLIC PANEL, BASIC [73624 CPT(R)] MICROALBUMIN, UR, RAND W/ MICROALB/CREAT RATIO [id number] CPT(R)] Follow-up Instructions Return in about 4 months (around 12/10/2018). Patient Instructions Diabetes. Continue efforts with diet and exercise. Medications: 
Continue Saxenda 3.0 mg daily ** Consider changing Saxend to Ozemic 0.5 weekly x 4 weeks and then increase to 1.0 mg weekly Continue metformin  mg x 2 twice daily Decrease Basaglar to 20 units and drop dose in 5 unit increments weekly if you see values < 90 at any time. Cholesterol: - Decrease atorvastatin to 10 mg daily. Introducing [facility]! Dear Edvin Gaxiola: 
Thank you for requesting a [employer] account. Our records indicate that you already have an active [employer] account. You can access your account anytime at [email or web address] Did you know that you can access your hospital and ER discharge instructions at any time in [employer]? You can also review all of your test results from your hospital stay or ER visit. Additional Information If you have questions, please visit the Frequently Asked Questions section of the [employer] website at [email or web address] Remember, [employer] is NOT to be used for urgent needs. For medical emergencies, dial 911. Now available from your iPhone and Android! Please provide this summary of care documentation to your next provider. Your primary care clinician is listed as Radha Portillo. If you have any questions after today's visit, please call [phone].

## 2018-08-10 NOTE — PROGRESS NOTES
Chief Complaint   Patient presents with    Diabetes     Visit Vitals    /67 (BP 1 Location: Left arm, BP Patient Position: Sitting)    Pulse 83    Resp 18    Ht 5' 9\" (1.753 m)    Wt 211 lb 6.4 oz (95.9 kg)    SpO2 97%    BMI 31.22 kg/m2

## 2018-10-23 RX ORDER — MONTELUKAST SODIUM 10 MG/1
TABLET ORAL
Qty: 30 TAB | Refills: 1 | Status: SHIPPED | OUTPATIENT
Start: 2018-10-23 | End: 2019-03-06 | Stop reason: SDUPTHER

## 2018-12-08 LAB
ALBUMIN/CREAT UR: <1.8 MG/G CREAT (ref 0–30)
BUN SERPL-MCNC: 18 MG/DL (ref 6–24)
BUN/CREAT SERPL: 16 (ref 9–20)
CALCIUM SERPL-MCNC: 9.6 MG/DL (ref 8.7–10.2)
CHLORIDE SERPL-SCNC: 103 MMOL/L (ref 96–106)
CHOLEST SERPL-MCNC: 94 MG/DL (ref 100–199)
CO2 SERPL-SCNC: 25 MMOL/L (ref 20–29)
CREAT SERPL-MCNC: 1.16 MG/DL (ref 0.76–1.27)
CREAT UR-MCNC: 168.5 MG/DL
EST. AVERAGE GLUCOSE BLD GHB EST-MCNC: 146 MG/DL
GLUCOSE SERPL-MCNC: 138 MG/DL (ref 65–99)
HBA1C MFR BLD: 6.7 % (ref 4.8–5.6)
HDLC SERPL-MCNC: 50 MG/DL
LDLC SERPL CALC-MCNC: 32 MG/DL (ref 0–99)
MICROALBUMIN UR-MCNC: <3 UG/ML
POTASSIUM SERPL-SCNC: 5.4 MMOL/L (ref 3.5–5.2)
SODIUM SERPL-SCNC: 141 MMOL/L (ref 134–144)
TRIGL SERPL-MCNC: 61 MG/DL (ref 0–149)
VLDLC SERPL CALC-MCNC: 12 MG/DL (ref 5–40)

## 2018-12-14 ENCOUNTER — OFFICE VISIT (OUTPATIENT)
Dept: ENDOCRINOLOGY | Age: 43
End: 2018-12-14

## 2018-12-14 VITALS
HEIGHT: 69 IN | WEIGHT: 194.6 LBS | BODY MASS INDEX: 28.82 KG/M2 | HEART RATE: 68 BPM | SYSTOLIC BLOOD PRESSURE: 121 MMHG | DIASTOLIC BLOOD PRESSURE: 75 MMHG

## 2018-12-14 DIAGNOSIS — Z82.49 FAMILY HISTORY OF EARLY CAD: ICD-10-CM

## 2018-12-14 DIAGNOSIS — E11.9 TYPE 2 DIABETES MELLITUS WITHOUT COMPLICATION, WITHOUT LONG-TERM CURRENT USE OF INSULIN (HCC): Primary | ICD-10-CM

## 2018-12-14 NOTE — PROGRESS NOTES
History of Present Illness: Sari Pritchett is a 39 y.o. male presents for follow-up of diabetes. he has had diabetes since 2007. A1c was 6.7 with pre-labs. Diabetes related complications:  No microalbuminuria  No retinopathy  No sx of neuropathy    Current diabetes regimen:  Ozempic 1.0 mg. This replaces [place]. He took 0.5 mg for several weeks, then transitioned to 1 mg dose a few weeks ago. ** Stopped Basaglar after last visit. Metformin ER 1000 mg twice daily    At initial visit in 2/2018 was taking 55 units of Basaglar and 6 units or more with each meal of NovoLog  - about 70 units/day total.   He has lost approximately 60 pounds in the last 10 months - about 25% of his body weight. Glucoses:  Fasting values ranged from  now. Were a little higher when he immediately changed from [place] to [address] and stopped insulin  Glucoses highest after exercise - 150-170     Diet:  He is getting full more quickly. Making better choices and definitely eating smaller portions    Exercise:   Swims every other day  Since last visit he has started running. Reports mile average has decreased by 2 minutes/mile    Lipids and family hx of CAD-  Taking atorvastatin 10 mg (from 36). Reviewed that LDL is now at 35    Social:    Has 15 yo daughter who likes to run. Also has 10 yo daughter. Recently was laid off after [employer] bought out [facility] [employer]. He received a severance package and will be looking for a new job. Past Medical History:   Diagnosis Date    Allergic rhinitis     Borderline glaucoma, open angle with borderline findings 2014    Diabetes ([address]     dx at age 34 in 2007    GERD (gastroesophageal reflux disease)     Hypercholesteremia      Current Outpatient Medications   Medication Sig    montelukast (SINGULAIR) 10 mg tablet TAKE 1 TABLET BY MOUTH EVERY DAY    atorvastatin (LIPITOR) 10 mg tablet Take 1 Tab by mouth daily.     semaglutide (OZEMPIC) 1 mg/0.75 mL (2 mg/1.5 mL) sub-q pen 1 mg by SubCUTAneous route every seven (7) days.  Insulin Needles, Disposable, (NOVOFINE 30) 30 gauge x 1/3\" USE TO INJECT INSULIN 4 TIMES DAILY.  metFORMIN ER (GLUCOPHAGE XR) 500 mg tablet Take 4 Tabs by mouth daily (with dinner).  glucose blood VI test strips (ONETOUCH ULTRA TEST) strip TEST 3-5 TIMES DAILY AS DIRECTED    levocetirizine (XYZAL) 5 mg tablet Take  by mouth.  multivitamin (ONE A DAY) tablet Take 1 Tab by mouth daily.  FAMOTIDINE (PEPCID PO) Take  by mouth nightly.  LORATADINE/PSEUDOEPHEDRINE (CLARITIN-D 24 HOUR PO) Take  by mouth.  cholecalciferol, vitamin d3, (VITAMIN D) 1,000 unit tablet Take  by mouth daily. No current facility-administered medications for this visit.       Allergies   Allergen Reactions    Metformin Nausea Only       Review of Systems:  - Eyes: no blurry vision or double vision  - Cardiovascular: no chest pain  - Respiratory: no shortness of breath  - Musculoskeletal: no myalgias  - Neurological: no numbness/tingling in extremities    Physical Examination:  Visit Vitals  /75 (BP 1 Location: Left arm, BP Patient Position: Sitting)   Pulse 68   Ht 5' 9\" (1.753 m)   Wt 194 lb 9.6 oz (88.3 kg)   BMI 28.74 kg/m²   -   - General: pleasant, no distress, normal gait   HEENT: hearing intact, EOMI, clear sclera without icterus  - Cardiovascular: regular, normal rate   - Respiratory: normal effort  - Integumentary: no edema  - Psychiatric: normal mood and affect    Data Reviewed:   Component      Latest Ref Rng & Units 12/7/2018 12/7/2018 12/7/2018 12/7/2018           9:57 AM  9:57 AM  9:57 AM  9:57 AM   Glucose      65 - 99 mg/dL 138 (H)      BUN      6 - 24 mg/dL 18      Creatinine      0.76 - 1.27 mg/dL 1.16      GFR est non-AA      >59 mL/min/1.73 76      GFR est AA      >59 mL/min/1.73 88      BUN/Creatinine ratio      9 - 20 16      Sodium      134 - 144 mmol/L 141      Potassium      3.5 - 5.2 mmol/L 5.4 (H)      Chloride      96 - 106 mmol/L 103      CO2      20 - 29 mmol/L 25      Calcium      8.7 - 10.2 mg/dL 9.6      Cholesterol, total      100 - 199 mg/dL  94 (L)     Triglyceride      0 - 149 mg/dL  61     HDL Cholesterol      >39 mg/dL  50     VLDL, calculated      5 - 40 mg/dL  12     LDL, calculated      0 - 99 mg/dL  32     Creatinine, urine      Not Estab. mg/dL    168.5   Microalbumin, urine      Not Estab. ug/mL    <3.0   Microalbumin/Creat. Ratio      0.0 - 30.0 mg/g creat    <1.8   Hemoglobin A1c, (calculated)      4.8 - 5.6 %   6.7 (H)    Estimated average glucose      mg/dL   146        Assessment/Plan:   1. Type 2 diabetes mellitus without complication, without long-term current use of insulin ([facility]   - he is doing very well. Off of insulin now. - continue Ozempic 1 mg weekly and metfomrin. - continue great efforts with diet and exercise. 2. Family history of early CAD   - continue atorvastatin 10 mg. Encouraged continued efforts with diet and exercise  Discussed obtaining cardiac CT for calcium scoring in the future. He will consider. 3. BMI 28.0-28.9,adult   - great response to GLP-1 class and less insulin       Patient Instructions   Diabetes. Continue efforts with diet and exercise. Medications:  Continue Ozempic    Continue metformin  mg x 2 twice daily    Cholesterol:  - can take 1/2 tablet atorvastatin daily if you wish.          Follow-up Disposition: Not on File    Copy sent to:

## 2018-12-14 NOTE — PATIENT INSTRUCTIONS
Diabetes. Continue efforts with diet and exercise. Medications:  Continue Ozempic    Continue metformin  mg x 2 twice daily    Cholesterol:  - continue 10 mg atorvastatin. Continue exercise and weight loss efforts.

## 2019-01-23 DIAGNOSIS — J30.1 ALLERGIC RHINITIS DUE TO POLLEN, UNSPECIFIED SEASONALITY: Primary | ICD-10-CM

## 2019-01-25 RX ORDER — MONTELUKAST SODIUM 10 MG/1
TABLET ORAL
Qty: 30 TAB | Refills: 1 | OUTPATIENT
Start: 2019-01-25

## 2019-03-06 ENCOUNTER — OFFICE VISIT (OUTPATIENT)
Dept: INTERNAL MEDICINE CLINIC | Age: 44
End: 2019-03-06

## 2019-03-06 VITALS
RESPIRATION RATE: 16 BRPM | TEMPERATURE: 98.1 F | SYSTOLIC BLOOD PRESSURE: 110 MMHG | HEIGHT: 69 IN | BODY MASS INDEX: 27.4 KG/M2 | DIASTOLIC BLOOD PRESSURE: 62 MMHG | HEART RATE: 78 BPM | OXYGEN SATURATION: 97 % | WEIGHT: 185 LBS

## 2019-03-06 DIAGNOSIS — Z00.00 WELL ADULT EXAM: Primary | ICD-10-CM

## 2019-03-06 DIAGNOSIS — J30.1 SEASONAL ALLERGIC RHINITIS DUE TO POLLEN: ICD-10-CM

## 2019-03-06 RX ORDER — MONTELUKAST SODIUM 10 MG/1
TABLET ORAL
Qty: 90 TAB | Refills: 1 | Status: SHIPPED | OUTPATIENT
Start: 2019-03-06 | End: 2019-08-29 | Stop reason: SDUPTHER

## 2019-03-06 NOTE — PROGRESS NOTES
Stephanie Naqvi is a 39 y.o. male    Chief Complaint   Patient presents with    Complete Physical     Pt is here for a CPE. 1. Have you been to the ER, urgent care clinic since your last visit? Hospitalized since your last visit? No     2. Have you seen or consulted any other health care providers outside of the [address] [place] [address] since your last visit? Include any pap smears or colon screening.   No     Health Maintenance Due   Topic Date Due    DTaP/Tdap/Td series (2 - Td) 05/05/2018    Influenza Age 5 to Adult  08/01/2018    FOOT EXAM Q1  02/16/2019       Visit Vitals  /62 (BP 1 Location: Left arm, BP Patient Position: Sitting)   Pulse 78   Temp 98.1 °F (36.7 °C) (Oral)   Resp 16   Ht 5' 9\" (1.753 m)   Wt 185 lb (83.9 kg)   SpO2 97%   BMI 27.32 kg/m²

## 2019-03-06 NOTE — PROGRESS NOTES
Comprehensive Physical Examination    Tena Avelar is a 39 y.o. male. he presents for a comprehensive physical examination. The patient reports feeling well today. He has no acute complaints. He has seen and followed up with endocrinology. He has lost a significant amount of weight given medication adjustments. He is presently on Ozempic and tolerating this well. He has lost nearly 50lbs since his last visit. He is exercising more and feeling very well. He reports blood sugars are stable    He has no other complaints. He will follow-up with endocrinology in the coming month. Patient Active Problem List    Diagnosis Date Noted    GERD (gastroesophageal reflux disease) 05/09/2014    Allergic rhinitis 02/12/2010    Diabetes mellitus type 2, uncontrolled, without complications ([facility] 73/75/4737    Hypercholesteremia 12/29/2009     Current Outpatient Medications   Medication Sig Dispense Refill    montelukast (SINGULAIR) 10 mg tablet TAKE 1 TABLET BY MOUTH EVERY DAY 90 Tab 1    glucose blood VI test strips (ONETOUCH ULTRA TEST) strip TEST 3-5 TIMES DAILY AS DIRECTED 300 Strip 3    atorvastatin (LIPITOR) 10 mg tablet Take 1 Tab by mouth daily. 30 Tab 11    semaglutide (OZEMPIC) 1 mg/0.75 mL (2 mg/1.5 mL) sub-q pen 1 mg by SubCUTAneous route every seven (7) days. 3 Box 3    Insulin Needles, Disposable, (NOVOFINE 30) 30 gauge x 1/3\" USE TO INJECT INSULIN 4 TIMES DAILY. 400 Pen Needle 3    metFORMIN ER (GLUCOPHAGE XR) 500 mg tablet Take 4 Tabs by mouth daily (with dinner). 360 Tab 3    levocetirizine (XYZAL) 5 mg tablet Take  by mouth.  multivitamin (ONE A DAY) tablet Take 1 Tab by mouth daily.  FAMOTIDINE (PEPCID PO) Take  by mouth nightly.  LORATADINE/PSEUDOEPHEDRINE (CLARITIN-D 24 HOUR PO) Take  by mouth.  cholecalciferol, vitamin d3, (VITAMIN D) 1,000 unit tablet Take  by mouth daily.        No Known Allergies  Past Medical History:   Diagnosis Date    Allergic rhinitis     Borderline glaucoma, open angle with borderline findings 2014    Diabetes ([address]     dx at age 34 in 2007    GERD (gastroesophageal reflux disease)     Hypercholesteremia      Past Surgical History:   Procedure Laterality Date    HX ORTHOPAEDIC      Right foot surgery    HX WISDOM TEETH EXTRACTION       Family History   Problem Relation Age of Onset    Diabetes Mother         age 54    Heart Disease Mother         CABG 3    Arthritis-osteo Mother     Elevated Lipids Mother     Hypertension Mother     COPD Mother     Cancer Father         multiple myelom    Elevated Lipids Brother     Heart Disease Maternal Grandmother     Cancer Maternal Grandmother         bone cancer    Diabetes Paternal Grandmother     Hypertension Paternal Grandmother      Social History     Tobacco Use    Smoking status: Never Smoker    Smokeless tobacco: Never Used   Substance Use Topics    Alcohol use: Yes     Alcohol/week: 1.5 oz     Types: 2 Glasses of wine, 1 Cans of beer per week        Health Maintenance   Topic Date Due    DTaP/Tdap/Td series (2 - Td) 05/05/2018    Influenza Age 5 to Adult  08/01/2018    FOOT EXAM Q1  02/16/2019    HEMOGLOBIN A1C Q6M  06/07/2019    MICROALBUMIN Q1  12/07/2019    LIPID PANEL Q1  12/07/2019    EYE EXAM RETINAL OR DILATED  02/25/2021    Pneumococcal 19-64 Medium Risk  Completed         Review of Systems   Constitutional: Negative. Respiratory: Negative. Cardiovascular: Negative. Visit Vitals  /62 (BP 1 Location: Left arm, BP Patient Position: Sitting)   Pulse 78   Temp 98.1 °F (36.7 °C) (Oral)   Resp 16   Ht 5' 9\" (1.753 m)   Wt 185 lb (83.9 kg)   SpO2 97%   BMI 27.32 kg/m²       Physical Exam   Constitutional: No distress. HENT:   Mouth/Throat: Oropharynx is clear and moist.   Cardiovascular: Normal rate and regular rhythm. Pulmonary/Chest: Effort normal and breath sounds normal.   Abdominal: Soft. Bowel sounds are normal. He exhibits no distension. Musculoskeletal: Normal range of motion. Lymphadenopathy:     He has no cervical adenopathy. ASSESSMENT/PLAN  Diagnoses and all orders for this visit:    1. Well adult exam    2. Uncontrolled type 2 diabetes mellitus without complication, without long-term current use of insulin ([facility])  -     glucose blood VI test strips (ONETOUCH ULTRA TEST) strip; TEST 3-5 TIMES DAILY AS DIRECTED    3. Seasonal allergic rhinitis due to pollen  -     montelukast (SINGULAIR) 10 mg tablet; TAKE 1 TABLET BY MOUTH EVERY DAY        Follow-up Disposition:  Return in about 6 months (around 9/6/2019), or if symptoms worsen or fail to improve, for Follow up.

## 2019-03-11 ENCOUNTER — TELEPHONE (OUTPATIENT)
Dept: INTERNAL MEDICINE CLINIC | Age: 44
End: 2019-03-11

## 2019-03-12 NOTE — TELEPHONE ENCOUNTER
Informed patient received communication from local pharmacy regarding change in diabetic supplies. Insurance now covers Accu-Chek. Pt in contact with insurance company yesterday. He advised do not send in supply order, already handled. Pt verbalized understanding.

## 2019-04-16 LAB
ALBUMIN SERPL-MCNC: 4.5 G/DL (ref 3.5–5.5)
ALBUMIN/GLOB SERPL: 2.3 {RATIO} (ref 1.2–2.2)
ALP SERPL-CCNC: 71 IU/L (ref 39–117)
ALT SERPL-CCNC: 27 IU/L (ref 0–44)
AST SERPL-CCNC: 24 IU/L (ref 0–40)
BILIRUB SERPL-MCNC: 0.7 MG/DL (ref 0–1.2)
BUN SERPL-MCNC: 17 MG/DL (ref 6–24)
BUN/CREAT SERPL: 16 (ref 9–20)
CALCIUM SERPL-MCNC: 9.6 MG/DL (ref 8.7–10.2)
CHLORIDE SERPL-SCNC: 101 MMOL/L (ref 96–106)
CHOLEST SERPL-MCNC: 103 MG/DL (ref 100–199)
CO2 SERPL-SCNC: 25 MMOL/L (ref 20–29)
CREAT SERPL-MCNC: 1.06 MG/DL (ref 0.76–1.27)
EST. AVERAGE GLUCOSE BLD GHB EST-MCNC: 128 MG/DL
GLOBULIN SER CALC-MCNC: 2 G/DL (ref 1.5–4.5)
GLUCOSE SERPL-MCNC: 126 MG/DL (ref 65–99)
HBA1C MFR BLD: 6.1 % (ref 4.8–5.6)
HDLC SERPL-MCNC: 49 MG/DL
LDLC SERPL CALC-MCNC: 40 MG/DL (ref 0–99)
POTASSIUM SERPL-SCNC: 5.5 MMOL/L (ref 3.5–5.2)
PROT SERPL-MCNC: 6.5 G/DL (ref 6–8.5)
SODIUM SERPL-SCNC: 140 MMOL/L (ref 134–144)
TRIGL SERPL-MCNC: 70 MG/DL (ref 0–149)
VLDLC SERPL CALC-MCNC: 14 MG/DL (ref 5–40)

## 2019-04-19 ENCOUNTER — OFFICE VISIT (OUTPATIENT)
Dept: ENDOCRINOLOGY | Age: 44
End: 2019-04-19

## 2019-04-19 VITALS
WEIGHT: 185.4 LBS | DIASTOLIC BLOOD PRESSURE: 70 MMHG | SYSTOLIC BLOOD PRESSURE: 122 MMHG | HEIGHT: 69 IN | BODY MASS INDEX: 27.46 KG/M2 | HEART RATE: 69 BPM

## 2019-04-19 DIAGNOSIS — E11.9 TYPE 2 DIABETES MELLITUS WITHOUT COMPLICATION, WITHOUT LONG-TERM CURRENT USE OF INSULIN (HCC): Primary | ICD-10-CM

## 2019-04-19 DIAGNOSIS — Z82.49 FAMILY HISTORY OF EARLY CAD: ICD-10-CM

## 2019-04-19 DIAGNOSIS — E78.00 HYPERCHOLESTEREMIA: ICD-10-CM

## 2019-04-19 NOTE — PATIENT INSTRUCTIONS
Diabetes. Continue efforts with diet and exercise and weight loss Medications: 
Continue Ozempic Continue metformin  mg x 2 twice daily Cholesterol: 
- continue 10 mg atorvastatin. Continue exercise and weight loss efforts.

## 2019-04-19 NOTE — PROGRESS NOTES
History of Present Illness: Jesus Henriquez is a 39 y.o. male presents for follow-up of diabetes. he has had diabetes since 2007. A1c was 6.1 with pre-labs.  
  
Diabetes related complications: 
No microalbuminuria. No retinopathy- saw eye doctor - has small catarcts No sx of neuropathy 
  
Current diabetes regimen: 
Ozempic 1.0 mg. Had some loose stools initially, but not tolerating well. Feels this helps with loose stools. Metformin ER 1000 mg twice daily 
  
At initial visit in 2/2018 was taking 55 units of Basaglar and 6 units or more with each meal of NovoLog  - about 70 units/day total.  
 
BMI 27:  Weight was about 68 lbs heavier at initial visit. Down additional 9 lbs since his last visit 
  
Glucoses: 
Monitoring daily - generally fasting values 102-130. Glucoses still go up after exercising, but less so.  
  
Diet: 
Portions are much less Overall doing very well, but can make some better choices. Exercise:  
Exercising every other day. Swims every other day and runs every other day. Ran 10 K in under 1 hour.  
  
Lipids and family hx of CAD-  Taking atorvastatin 10 mg (from 40). lipids are at goal.  
  
Social: 
 Has 15 yo daughter who likes to run. Also has 8 yo daughter. Past Medical History:  
Diagnosis Date  Allergic rhinitis  Borderline glaucoma, open angle with borderline findings 2014  Diabetes ([address]   
 dx at age 34 in 2007  GERD (gastroesophageal reflux disease)  Hypercholesteremia Current Outpatient Medications Medication Sig  
 montelukast (SINGULAIR) 10 mg tablet TAKE 1 TABLET BY MOUTH EVERY DAY  atorvastatin (LIPITOR) 10 mg tablet Take 1 Tab by mouth daily.  semaglutide (OZEMPIC) 1 mg/0.75 mL (2 mg/1.5 mL) sub-q pen 1 mg by SubCUTAneous route every seven (7) days.  metFORMIN ER (GLUCOPHAGE XR) 500 mg tablet Take 4 Tabs by mouth daily (with dinner).  levocetirizine (XYZAL) 5 mg tablet Take  by mouth.  multivitamin (ONE A DAY) tablet Take 1 Tab by mouth daily.  FAMOTIDINE (PEPCID PO) Take  by mouth nightly.  LORATADINE/PSEUDOEPHEDRINE (CLARITIN-D 24 HOUR PO) Take  by mouth.  cholecalciferol, vitamin d3, (VITAMIN D) 1,000 unit tablet Take  by mouth daily. No current facility-administered medications for this visit. No Known Allergies Review of Systems: - Eyes: no blurry vision or double vision - Cardiovascular: no chest pain - Respiratory: no shortness of breath - Musculoskeletal: no myalgias - Neurological: no numbness/tingling in extremities Physical Examination: 
Visit Vitals /70 (BP 1 Location: Left arm, BP Patient Position: Sitting) Pulse 69 Ht 5' 9\" (1.753 m) Wt 185 lb 6.4 oz (84.1 kg) BMI 27.38 kg/m²  
-  
- General: pleasant, no distress, normal gait HEENT: hearing intact, EOMI, clear sclera without icterus - Cardiovascular: regular, normal rate - Respiratory: normal effort - Integumentary: no edema Diabetic foot exam:  
 
Left Foot: 
 Visual Exam: normal  
 Pulse DP: 2+ (normal) Filament test: normal sensation Right Foot: 
 Visual Exam: normal  
 Pulse DP: 2+ (normal) Filament test: normal sensation - Psychiatric: normal mood and affect Data Reviewed:  
Lab Results Component Value Date/Time Hemoglobin A1c 6.1 04/15/2019 09:00 AM  
  
Lab Results Component Value Date/Time Sodium 140 04/15/2019 09:00 AM  
 Potassium 5.5 04/15/2019 09:00 AM  
 Creatinine 1.06 04/15/2019 09:00 AM  
 Microalb/Creat ratio (ug/mg creat.) <1.8 12/07/2018 09:57 AM  
  
 
Lab Results Component Value Date/Time Cholesterol, total 103 04/15/2019 09:00 AM  
 HDL Cholesterol 49 04/15/2019 09:00 AM  
 LDL, calculated 40 04/15/2019 09:00 AM  
 Triglyceride 70 04/15/2019 09:00 AM  
  
Lab Results Component Value Date/Time TSH 1.490 04/27/2018 08:55 AM  
  
 
Assessment/Plan: 1.  Type 2 diabetes mellitus without complication, without long-term current use of insulin ([facility]  
- controlled 
- continue Ozempic, metformin and diet and exercise efforts 2. Family history of early CAD  
- continue atorvastatin. LDL is at goal. BP controlled. 3. Hypercholesteremia  
- continue atorvastatin and weight loss efforts 4. BMI 27.0-27.9,adult  
- he is doing a great job. Encouraged continued exercise efforts to maintain weight loss Patient Instructions Diabetes. Continue efforts with diet and exercise and weight loss Medications: 
Continue Ozempic Continue metformin  mg x 2 twice daily Cholesterol: 
- continue 10 mg atorvastatin. Continue exercise and weight loss efforts. Follow-up and Dispositions · Return in about 6 months (around 10/19/2019). Copy sent to:

## 2019-07-04 RX ORDER — METFORMIN HYDROCHLORIDE 500 MG/1
2000 TABLET, EXTENDED RELEASE ORAL
Qty: 360 TAB | Refills: 3 | Status: SHIPPED | OUTPATIENT
Start: 2019-07-04 | End: 2020-03-24 | Stop reason: SDUPTHER

## 2019-08-29 DIAGNOSIS — J30.1 SEASONAL ALLERGIC RHINITIS DUE TO POLLEN: ICD-10-CM

## 2019-08-29 RX ORDER — MONTELUKAST SODIUM 10 MG/1
TABLET ORAL
Qty: 90 TAB | Refills: 1 | Status: SHIPPED | OUTPATIENT
Start: 2019-08-29 | End: 2020-02-25

## 2019-10-12 LAB
ALBUMIN SERPL-MCNC: 4.3 G/DL (ref 3.5–5.5)
ALBUMIN/CREAT UR: 2.3 MG/G CREAT (ref 0–30)
ALBUMIN/GLOB SERPL: 2.2 {RATIO} (ref 1.2–2.2)
ALP SERPL-CCNC: 70 IU/L (ref 39–117)
ALT SERPL-CCNC: 37 IU/L (ref 0–44)
AST SERPL-CCNC: 26 IU/L (ref 0–40)
BILIRUB SERPL-MCNC: 0.5 MG/DL (ref 0–1.2)
BUN SERPL-MCNC: 14 MG/DL (ref 6–24)
BUN/CREAT SERPL: 12 (ref 9–20)
CALCIUM SERPL-MCNC: 9.6 MG/DL (ref 8.7–10.2)
CHLORIDE SERPL-SCNC: 101 MMOL/L (ref 96–106)
CHOLEST SERPL-MCNC: 99 MG/DL (ref 100–199)
CO2 SERPL-SCNC: 25 MMOL/L (ref 20–29)
CREAT SERPL-MCNC: 1.13 MG/DL (ref 0.76–1.27)
CREAT UR-MCNC: 177 MG/DL
EST. AVERAGE GLUCOSE BLD GHB EST-MCNC: 128 MG/DL
GLOBULIN SER CALC-MCNC: 2 G/DL (ref 1.5–4.5)
GLUCOSE SERPL-MCNC: 132 MG/DL (ref 65–99)
HBA1C MFR BLD: 6.1 % (ref 4.8–5.6)
HDLC SERPL-MCNC: 55 MG/DL
LDLC SERPL CALC-MCNC: 31 MG/DL (ref 0–99)
MICROALBUMIN UR-MCNC: 4.1 UG/ML
POTASSIUM SERPL-SCNC: 4.8 MMOL/L (ref 3.5–5.2)
PROT SERPL-MCNC: 6.3 G/DL (ref 6–8.5)
SODIUM SERPL-SCNC: 140 MMOL/L (ref 134–144)
TRIGL SERPL-MCNC: 63 MG/DL (ref 0–149)
VLDLC SERPL CALC-MCNC: 13 MG/DL (ref 5–40)

## 2019-10-18 ENCOUNTER — OFFICE VISIT (OUTPATIENT)
Dept: ENDOCRINOLOGY | Age: 44
End: 2019-10-18

## 2019-10-18 VITALS
WEIGHT: 183 LBS | HEIGHT: 69 IN | HEART RATE: 62 BPM | SYSTOLIC BLOOD PRESSURE: 122 MMHG | BODY MASS INDEX: 27.11 KG/M2 | DIASTOLIC BLOOD PRESSURE: 75 MMHG

## 2019-10-18 DIAGNOSIS — Z82.49 FAMILY HISTORY OF EARLY CAD: ICD-10-CM

## 2019-10-18 DIAGNOSIS — E11.9 TYPE 2 DIABETES MELLITUS WITHOUT COMPLICATION, WITHOUT LONG-TERM CURRENT USE OF INSULIN (HCC): Primary | ICD-10-CM

## 2019-10-18 NOTE — PATIENT INSTRUCTIONS
Diabetes. Continue efforts with diet and exercise and weight loss  Work on improving sleep - aim for 7 hours/night. Medications:  Continue Ozempic  Continue metformin  mg x 2 twice daily  - can decrease dose if you have any stomach-related problems    Cholesterol:  - continue 10 mg atorvastatin. Continue exercise and weight loss efforts.

## 2019-10-18 NOTE — PROGRESS NOTES
History of Present Illness: Rolanda Nissen is a 38 y.o. male presents for follow-up of diabetes  he has had diabetes since 2007. A1c was 6.1 with pre-labs.      Diabetes related complications:  No microalbuminuria. No retinopathy- saw eye doctor - has small catarcts   No sx of neuropathy     Current diabetes regimen:  Ozempic 1.0 mg.  He feels this is helping significantly with his appetite. Metformin ER 1000 mg twice daily     At initial visit in 2/2018 was taking 55 units of Basaglar and 6 units or more with each meal of NovoLog  - about 70 units/day total.     BMI 27:  Weight was about 70 lbs heavier at initial visit in 2/2018.      Glucoses:  120 range. Glucoses often higher after exercise.      Diet:  Portions are much less  Overall doing very well, but can make some better choices. Exercise:   Exercising every day. Swims every other day and runs every other day. . 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 Helping  [employer] cross [employer] team.      Lipids and family hx of CAD-  Taking atorvastatin 10 mg. Social:    Has 15 yo daughter who likes to run. Also has 8 yo daughter. 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  atorvastatin (LIPITOR) 10 mg tablet TAKE 1 TABLET BY MOUTH EVERY DAY    semaglutide (OZEMPIC) 1 mg/0.75 mL (2 mg/1.5 mL) sub-q pen 1 mg by SubCUTAneous route every seven (7) days.  levocetirizine (XYZAL) 5 mg tablet Take  by mouth.  multivitamin (ONE A DAY) tablet Take 1 Tab by mouth daily.     FAMOTIDINE (PEPCID PO) Take  by mouth nightly.  LORATADINE/PSEUDOEPHEDRINE (CLARITIN-D 24 HOUR PO) Take  by mouth.  cholecalciferol, vitamin d3, (VITAMIN D) 1,000 unit tablet Take  by mouth daily. No current facility-administered medications for this visit. No Known Allergies    Review of Systems:  - Eyes: no blurry vision or double vision  - Cardiovascular: no chest pain  - Respiratory: no shortness of breath  - Musculoskeletal: no myalgias  - Neurological: no numbness/tingling in extremities    Physical Examination:  Visit Vitals  /75 (BP 1 Location: Right arm, BP Patient Position: Sitting)   Pulse 62   Ht 5' 9\" (1.753 m)   Wt 183 lb (83 kg)   BMI 27.02 kg/m²   -   - General: pleasant, no distress, normal gait   HEENT: hearing intact, EOMI, clear sclera without icterus  - Cardiovascular: regular, normal rate   - Respiratory: normal effort  - Integumentary: no edema  - Psychiatric: normal mood and affect    Data Reviewed:   Lab Results   Component Value Date/Time    Hemoglobin A1c 6.1 10/11/2019 08:21 AM      Lab Results   Component Value Date/Time    Sodium 140 10/11/2019 08:21 AM    Potassium 4.8 10/11/2019 08:21 AM    Creatinine 1.13 10/11/2019 08:21 AM    Microalb/Creat ratio (ug/mg creat.) 2.3 10/11/2019 08:21 AM        Lab Results   Component Value Date/Time    Cholesterol, total 99 10/11/2019 08:21 AM    HDL Cholesterol 55 10/11/2019 08:21 AM    LDL, calculated 31 10/11/2019 08:21 AM    Triglyceride 63 10/11/2019 08:21 AM      Lab Results   Component Value Date/Time    TSH 1.490 04/27/2018 08:55 AM        Assessment/Plan:   1. Type 2 diabetes mellitus without complication, without long-term current use of insulin (HCC)   Diabetes is under good control with Ozempic, metformin and continued lifestyle efforts. He has lost over 70 pounds in the last several years. We will have him continue current medications and continue efforts to make further improvements with diet and exercise.   He is doing an excellent job with exercise, so primarily encouraged him to see if he can make further improvements with diet   2. BMI 27.0-27.9,adult   See above. Weight loss has helped to improve diabetes control immensely. 3. Family history of early CAD   Continue atorvastatin 10 mg     Patient Instructions   Diabetes. Continue efforts with diet and exercise and weight loss  Work on improving sleep - aim for 7 hours/night. Medications:  Continue Ozempic  Continue metformin  mg x 2 twice daily  - can decrease dose if you have any stomach-related problems    Cholesterol:  - continue 10 mg atorvastatin. Continue exercise and weight loss efforts.

## 2020-02-25 DIAGNOSIS — J30.1 SEASONAL ALLERGIC RHINITIS DUE TO POLLEN: ICD-10-CM

## 2020-02-25 RX ORDER — MONTELUKAST SODIUM 10 MG/1
TABLET ORAL
Qty: 90 TAB | Refills: 1 | Status: SHIPPED | OUTPATIENT
Start: 2020-02-25 | End: 2020-08-19

## 2020-02-29 LAB
ALBUMIN SERPL-MCNC: 4.6 G/DL (ref 4–5)
ALBUMIN/CREAT UR: <4 MG/G CREAT (ref 0–29)
ALBUMIN/GLOB SERPL: 2.4 {RATIO} (ref 1.2–2.2)
ALP SERPL-CCNC: 74 IU/L (ref 39–117)
ALT SERPL-CCNC: 41 IU/L (ref 0–44)
AST SERPL-CCNC: 31 IU/L (ref 0–40)
BILIRUB SERPL-MCNC: 0.5 MG/DL (ref 0–1.2)
BUN SERPL-MCNC: 16 MG/DL (ref 6–24)
BUN/CREAT SERPL: 15 (ref 9–20)
CALCIUM SERPL-MCNC: 9.6 MG/DL (ref 8.7–10.2)
CHLORIDE SERPL-SCNC: 99 MMOL/L (ref 96–106)
CHOLEST SERPL-MCNC: 105 MG/DL (ref 100–199)
CO2 SERPL-SCNC: 26 MMOL/L (ref 20–29)
CREAT SERPL-MCNC: 1.05 MG/DL (ref 0.76–1.27)
CREAT UR-MCNC: 80.9 MG/DL
EST. AVERAGE GLUCOSE BLD GHB EST-MCNC: 126 MG/DL
GLOBULIN SER CALC-MCNC: 1.9 G/DL (ref 1.5–4.5)
GLUCOSE SERPL-MCNC: 116 MG/DL (ref 65–99)
HBA1C MFR BLD: 6 % (ref 4.8–5.6)
HDLC SERPL-MCNC: 57 MG/DL
LDLC SERPL CALC-MCNC: 34 MG/DL (ref 0–99)
MICROALBUMIN UR-MCNC: <3 UG/ML
POTASSIUM SERPL-SCNC: 4.9 MMOL/L (ref 3.5–5.2)
PROT SERPL-MCNC: 6.5 G/DL (ref 6–8.5)
SODIUM SERPL-SCNC: 138 MMOL/L (ref 134–144)
TRIGL SERPL-MCNC: 68 MG/DL (ref 0–149)
VLDLC SERPL CALC-MCNC: 14 MG/DL (ref 5–40)

## 2020-02-29 NOTE — PROGRESS NOTES
Bonnie Daily,    It appears these labs were drawn as pre-labs prior to pt's visit with Dr. Mari Stock that was scheduled for April 2020 but now that Dr. Mari Stock has left as of 1/19/20 and has an appt with you on 3/13/20, I am forwarding them to you so you can review them with him at his upcoming visit.     Thanks,  [employer]

## 2020-03-24 ENCOUNTER — OFFICE VISIT (OUTPATIENT)
Dept: INTERNAL MEDICINE CLINIC | Age: 45
End: 2020-03-24

## 2020-03-24 VITALS
DIASTOLIC BLOOD PRESSURE: 80 MMHG | WEIGHT: 186.4 LBS | TEMPERATURE: 98.2 F | OXYGEN SATURATION: 97 % | SYSTOLIC BLOOD PRESSURE: 120 MMHG | RESPIRATION RATE: 19 BRPM | HEIGHT: 69 IN | HEART RATE: 75 BPM | BODY MASS INDEX: 27.61 KG/M2

## 2020-03-24 DIAGNOSIS — Z00.00 WELL ADULT EXAM: Primary | ICD-10-CM

## 2020-03-24 DIAGNOSIS — Z23 ENCOUNTER FOR IMMUNIZATION: ICD-10-CM

## 2020-03-24 DIAGNOSIS — E78.00 HYPERCHOLESTEREMIA: ICD-10-CM

## 2020-03-24 DIAGNOSIS — K21.9 GASTROESOPHAGEAL REFLUX DISEASE, ESOPHAGITIS PRESENCE NOT SPECIFIED: ICD-10-CM

## 2020-03-24 RX ORDER — SEMAGLUTIDE 1.34 MG/ML
INJECTION, SOLUTION SUBCUTANEOUS
Qty: 9 ML | Refills: 3 | Status: SHIPPED | OUTPATIENT
Start: 2020-03-24 | End: 2021-03-26

## 2020-03-24 RX ORDER — METFORMIN HYDROCHLORIDE 500 MG/1
2000 TABLET, EXTENDED RELEASE ORAL
Qty: 360 TAB | Refills: 1 | Status: SHIPPED | OUTPATIENT
Start: 2020-03-24 | End: 2020-10-01

## 2020-03-24 NOTE — PROGRESS NOTES
Comprehensive Physical Examination    Pereira May is a 38 y.o. male. he presents for a comprehensive physical examination. Endocrine Review  He is seen for diabetes. Since last visit: he has continued exercise, eating right. Home glucose monitoring: is performed regularly. He reports medication compliance: compliant all of the time. He reports the following medication side effects: none. Diabetic diet compliance: compliant all of the time. Further diabetic ROS: no hypoglycemia, weight has decreased. Lab review: labs are reviewed, up to date and normal, A1c today is 6.0. He has seen optho in April last year. Patient Active Problem List    Diagnosis Date Noted    GERD (gastroesophageal reflux disease) 05/09/2014    Allergic rhinitis 02/12/2010    Diabetes mellitus type 2, uncontrolled, without complications ([facility] 22/71/5112    Hypercholesteremia 12/29/2009     Current Outpatient Medications   Medication Sig Dispense Refill    montelukast (SINGULAIR) 10 mg tablet TAKE 1 TABLET BY MOUTH EVERY DAY 90 Tab 1    Blood Sugar Diagnostic, Drum (ACCU-CHEK COMPACT PLUS TEST) strp Test 3-5 times daily as directed. 300 Strip 3    OZEMPIC 1 mg/dose (2 mg/1.5 mL) sub-q pen INJECT 1 MG EVERY 7 DAYS 9 mL 3    ONETOUCH ULTRA BLUE TEST STRIP strip TEST 3-5 TIMES DAILY AS DIRECTED 300 Strip 3    metFORMIN ER (GLUCOPHAGE XR) 500 mg tablet TAKE 4 TABS BY MOUTH DAILY (WITH DINNER). 360 Tab 3    atorvastatin (LIPITOR) 10 mg tablet TAKE 1 TABLET BY MOUTH EVERY DAY 30 Tab 11    levocetirizine (XYZAL) 5 mg tablet Take  by mouth.  multivitamin (ONE A DAY) tablet Take 1 Tab by mouth daily.  FAMOTIDINE (PEPCID PO) Take  by mouth nightly.  LORATADINE/PSEUDOEPHEDRINE (CLARITIN-D 24 HOUR PO) Take  by mouth.  cholecalciferol, vitamin d3, (VITAMIN D) 1,000 unit tablet Take  by mouth daily.        No Known Allergies  Past Medical History:   Diagnosis Date    Allergic rhinitis     Borderline glaucoma, open angle with borderline findings 2014    Diabetes ([address]     dx at age 34 in 2007    GERD (gastroesophageal reflux disease)     Hypercholesteremia      Past Surgical History:   Procedure Laterality Date    HX ORTHOPAEDIC      Right foot surgery    HX WISDOM TEETH EXTRACTION       Family History   Problem Relation Age of Onset    Diabetes Mother         age 54    Heart Disease Mother         CABG 3    Arthritis-osteo Mother     Elevated Lipids Mother     Hypertension Mother     COPD Mother     Cancer Father         multiple myelom    Elevated Lipids Brother     Heart Disease Maternal Grandmother     Cancer Maternal Grandmother         bone cancer    Diabetes Paternal Grandmother     Hypertension Paternal Grandmother      Social History     Tobacco Use    Smoking status: Never Smoker    Smokeless tobacco: Never Used   Substance Use Topics    Alcohol use: Yes     Alcohol/week: 2.5 standard drinks     Types: 2 Glasses of wine, 1 Cans of beer per week        Health Maintenance   Topic Date Due    Pneumococcal 0-64 years (1 of 1 - PPSV23) 06/30/2008    DTaP/Tdap/Td series (2 - Td) 05/05/2018    Foot Exam Q1  04/19/2020    Eye Exam Retinal or Dilated  02/25/2021    A1C test (Diabetic or Prediabetic)  02/28/2021    MICROALBUMIN Q1  02/28/2021    Lipid Screen  02/28/2021    Influenza Age 9 to Adult  Completed         Review of Systems   Constitutional: Negative. Respiratory: Negative. Cardiovascular: Negative. Gastrointestinal: Negative. Visit Vitals  /80 (BP 1 Location: Right arm, BP Patient Position: Sitting)   Pulse 75   Temp 98.2 °F (36.8 °C)   Resp 19   Ht 5' 9.33\" (1.761 m)   Wt 186 lb 6.4 oz (84.6 kg)   SpO2 97%   BMI 27.26 kg/m²       Physical Exam  Constitutional:       Appearance: Normal appearance. Neck:      Musculoskeletal: Normal range of motion. Cardiovascular:      Rate and Rhythm: Normal rate and regular rhythm. Heart sounds: No murmur. Pulmonary:      Effort: Pulmonary effort is normal.      Breath sounds: Normal breath sounds. Abdominal:      General: Bowel sounds are normal.      Palpations: Abdomen is soft. Lymphadenopathy:      Cervical: No cervical adenopathy. Neurological:      Mental Status: He is alert. ASSESSMENT/PLAN  Diagnoses and all orders for this visit:    1. Well adult exam    2. Encounter for immunization  -     IA IMMUNIZ ADMIN,1 SINGLE/COMB VAC/TOXOID  -     TETANUS, DIPHTHERIA TOXOIDS AND ACELLULAR PERTUSSIS VACCINE (TDAP), IN INDIVIDS. >=7, IM    3. Diabetes mellitus type 2, uncontrolled, without complications (HCC)  -      DIABETES FOOT EXAM  -     metFORMIN ER (GLUCOPHAGE XR) 500 mg tablet; Take 4 Tabs by mouth daily (with dinner). -     semaglutide (Ozempic) 1 mg/dose (2 mg/1.5 mL) sub-q pen; INJECT 1 MG EVERY 7 DAYS    4. Hypercholesteremia    5. Gastroesophageal reflux disease, esophagitis presence not specified           Follow-up and Dispositions    · Return in about 6 months (around 9/24/2020) for Follow up.

## 2020-06-21 ENCOUNTER — PATIENT MESSAGE (OUTPATIENT)
Dept: INTERNAL MEDICINE CLINIC | Age: 45
End: 2020-06-21

## 2020-06-21 DIAGNOSIS — E78.00 HYPERCHOLESTEREMIA: Primary | ICD-10-CM

## 2020-06-24 DIAGNOSIS — E78.00 HYPERCHOLESTEREMIA: Primary | ICD-10-CM

## 2020-06-24 RX ORDER — ATORVASTATIN CALCIUM 10 MG/1
TABLET, FILM COATED ORAL
Qty: 90 TAB | Refills: 1 | Status: SHIPPED | OUTPATIENT
Start: 2020-06-24 | End: 2020-10-01

## 2020-06-24 RX ORDER — ATORVASTATIN CALCIUM 10 MG/1
TABLET, FILM COATED ORAL
Qty: 90 TAB | Refills: 1 | Status: CANCELLED | OUTPATIENT
Start: 2020-06-24

## 2020-08-19 DIAGNOSIS — J30.1 SEASONAL ALLERGIC RHINITIS DUE TO POLLEN: ICD-10-CM

## 2020-08-19 RX ORDER — MONTELUKAST SODIUM 10 MG/1
TABLET ORAL
Qty: 90 TAB | Refills: 1 | Status: SHIPPED | OUTPATIENT
Start: 2020-08-19 | End: 2021-02-16

## 2020-09-24 ENCOUNTER — OFFICE VISIT (OUTPATIENT)
Dept: INTERNAL MEDICINE CLINIC | Age: 45
End: 2020-09-24
Payer: COMMERCIAL

## 2020-09-24 VITALS
SYSTOLIC BLOOD PRESSURE: 130 MMHG | HEIGHT: 69 IN | DIASTOLIC BLOOD PRESSURE: 80 MMHG | TEMPERATURE: 98 F | BODY MASS INDEX: 27.19 KG/M2 | HEART RATE: 71 BPM | OXYGEN SATURATION: 97 % | WEIGHT: 183.6 LBS | RESPIRATION RATE: 16 BRPM

## 2020-09-24 DIAGNOSIS — G89.29 CHRONIC LEFT SHOULDER PAIN: ICD-10-CM

## 2020-09-24 DIAGNOSIS — G62.9 NEUROPATHY: ICD-10-CM

## 2020-09-24 DIAGNOSIS — E11.9 TYPE 2 DIABETES MELLITUS WITHOUT COMPLICATION, WITHOUT LONG-TERM CURRENT USE OF INSULIN (HCC): ICD-10-CM

## 2020-09-24 DIAGNOSIS — Z00.00 WELL ADULT EXAM: Primary | ICD-10-CM

## 2020-09-24 DIAGNOSIS — Z12.5 PROSTATE CANCER SCREENING: ICD-10-CM

## 2020-09-24 DIAGNOSIS — M25.512 CHRONIC LEFT SHOULDER PAIN: ICD-10-CM

## 2020-09-24 PROCEDURE — 99214 OFFICE O/P EST MOD 30 MIN: CPT | Performed by: INTERNAL MEDICINE

## 2020-09-24 NOTE — PATIENT INSTRUCTIONS
Rotator Cuff: Exercises Introduction Here are some examples of exercises for you to try. The exercises may be suggested for a condition or for rehabilitation. Start each exercise slowly. Ease off the exercises if you start to have pain. You will be told when to start these exercises and which ones will work best for you. How to do the exercises Pendulum swing If you have pain in your back, do not do this exercise. 1. Hold on to a table or the back of a chair with your good arm. Then bend forward a little and let your sore arm hang straight down. This exercise does not use the arm muscles. Rather, use your legs and your hips to create movement that makes your arm swing freely. 2. Use the movement from your hips and legs to guide the slightly swinging arm back and forth like a pendulum (or elephant trunk). Then guide it in circles that start small (about the size of a dinner plate). Make the circles a bit larger each day, as your pain allows. 3. Do this exercise for 5 minutes, 5 to 7 times each day. 4. As you have less pain, try bending over a little farther to do this exercise. This will increase the amount of movement at your shoulder. Posterior stretching exercise 1. Hold the elbow of your injured arm with your other hand. 2. Use your hand to pull your injured arm gently up and across your body. You will feel a gentle stretch across the back of your injured shoulder. 3. Hold for at least 15 to 30 seconds. Then slowly lower your arm. 4. Repeat 2 to 4 times. Up-the-back stretch Your doctor or physical therapist may want you to wait to do this stretch until you have regained most of your range of motion and strength. You can do this stretch in different ways. Hold any of these stretches for at least 15 to 30 seconds. Repeat them 2 to 4 times. 1. Light stretch: Put your hand in your back pocket. Let it rest there to stretch your shoulder. 2. Moderate stretch: With your other hand, hold your injured arm (palm outward) behind your back by the wrist. Pull your arm up gently to stretch your shoulder. 3. Advanced stretch: Put a towel over your other shoulder. Put the hand of your injured arm behind your back. Now hold the back end of the towel. With the other hand, hold the front end of the towel in front of your body. Pull gently on the front end of the towel. This will bring your hand farther up your back to stretch your shoulder. Overhead stretch 1. Standing about an arm's length away, grasp onto a solid surface. You could use a countertop, a doorknob, or the back of a sturdy chair. 2. With your knees slightly bent, bend forward with your arms straight. Lower your upper body, and let your shoulders stretch. 3. As your shoulders are able to stretch farther, you may need to take a step or two backward. 4. Hold for at least 15 to 30 seconds. Then stand up and relax. If you had stepped back during your stretch, step forward so you can keep your hands on the solid surface. 5. Repeat 2 to 4 times. Shoulder flexion (lying down) To make a wand for this exercise, use a piece of PVC pipe or a broom handle with the broom removed. Make the wand about a foot wider than your shoulders. 1. Lie on your back, holding a wand with both hands. Your palms should face down as you hold the wand. 2. Keeping your elbows straight, slowly raise your arms over your head. Raise them until you feel a stretch in your shoulders, upper back, and chest. 
3. Hold for 15 to 30 seconds. 4. Repeat 2 to 4 times. Shoulder rotation (lying down) To make a wand for this exercise, use a piece of PVC pipe or a broom handle with the broom removed. Make the wand about a foot wider than your shoulders. 1. Lie on your back. Hold a wand with both hands with your elbows bent and palms up.  
2. Keep your elbows close to your body, and move the wand across your body toward the sore arm. 3. Hold for 8 to 12 seconds. 4. Repeat 2 to 4 times. Wall climbing (to the side) Avoid any movement that is straight to your side, and be careful not to arch your back. Your arm should stay about 30 degrees to the front of your side. 1. Stand with your side to a wall so that your fingers can just touch it at an angle about 30 degrees toward the front of your body. 2. Walk the fingers of your injured arm up the wall as high as pain permits. Try not to shrug your shoulder up toward your ear as you move your arm up. 3. Hold that position for a count of at least 15 to 20. 
4. Walk your fingers back down to the starting position. 5. Repeat at least 2 to 4 times. Try to reach higher each time. Wall climbing (to the front) During this stretching exercise, be careful not to arch your back. 1. Face a wall, and stand so your fingers can just touch it. 2. Keeping your shoulder down, walk the fingers of your injured arm up the wall as high as pain permits. (Don't shrug your shoulder up toward your ear.) 3. Hold your arm in that position for at least 15 to 30 seconds. 4. Slowly walk your fingers back down to where you started. 5. Repeat at least 2 to 4 times. Try to reach higher each time. Shoulder blade squeeze 1. Stand with your arms at your sides, and squeeze your shoulder blades together. Do not raise your shoulders up as you squeeze. 2. Hold 6 seconds. 3. Repeat 8 to 12 times. Scapular exercise: Arm reach 1. Lie flat on your back. This exercise is a very slight motion that starts with your arms raised (elbows straight, arms straight). 2. From this position, reach higher toward the sobeida or ceiling. Keep your elbows straight. All motion should be from your shoulder blade only. 3. Relax your arms back to where you started. 4. Repeat 8 to 12 times. Arm raise to the side During this strengthening exercise, your arm should stay about 30 degrees to the front of your side. 1. Slowly raise your injured arm to the side, with your thumb facing up. Raise your arm 60 degrees at the most (shoulder level is 90 degrees). 2. Hold the position for 3 to 5 seconds. Then lower your arm back to your side. If you need to, bring your \"good\" arm across your body and place it under the elbow as you lower your injured arm. Use your good arm to keep your injured arm from dropping down too fast. 
3. Repeat 8 to 12 times. 4. When you first start out, don't hold any extra weight in your hand. As you get stronger, you may use a 1-pound to 2-pound dumbbell or a small can of food. Shoulder flexor and extensor exercise These are isometric exercises. That means you contract your muscles without actually moving. 1. Push forward (flex): Stand facing a wall or doorjamb, about 6 inches or less back. Hold your injured arm against your body. Make a closed fist with your thumb on top. Then gently push your hand forward into the wall with about 25% to 50% of your strength. Don't let your body move backward as you push. Hold for about 6 seconds. Relax for a few seconds. Repeat 8 to 12 times. 2. Push backward (extend): Stand with your back flat against a wall. Your upper arm should be against the wall, with your elbow bent 90 degrees (your hand straight ahead). Push your elbow gently back against the wall with about 25% to 50% of your strength. Don't let your body move forward as you push. Hold for about 6 seconds. Relax for a few seconds. Repeat 8 to 12 times. Scapular exercise: Wall push-ups This exercise is best done with your fingers somewhat turned out, rather than straight up and down. 1. Stand facing a wall, about 12 inches to 18 inches away. 2. Place your hands on the wall at shoulder height. 3. Slowly bend your elbows and bring your face to the wall. Keep your back and hips straight. 4. Push back to where you started. 5. Repeat 8 to 12 times. 6. When you can do this exercise against a wall comfortably, you can try it against a counter. You can then slowly progress to the end of a couch, then to a sturdy chair, and finally to the floor. Scapular exercise: Retraction For this exercise, you will need elastic exercise material, such as surgical tubing or Thera-Band. 1. Put the band around a solid object at about waist level. (A bedpost will work well.) Each hand should hold an end of the band. 2. With your elbows at your sides and bent to 90 degrees, pull the band back. Your shoulder blades should move toward each other. Then move your arms back where you started. 3. Repeat 8 to 12 times. 4. If you have good range of motion in your shoulders, try this exercise with your arms lifted out to the sides. Keep your elbows at a 90-degree angle. Raise the elastic band up to about shoulder level. Pull the band back to move your shoulder blades toward each other. Then move your arms back where you started. Internal rotator strengthening exercise 1. Start by tying a piece of elastic exercise material to a doorknob. You can use surgical tubing or Thera-Band. 2. Stand or sit with your shoulder relaxed and your elbow bent 90 degrees. Your upper arm should rest comfortably against your side. Squeeze a rolled towel between your elbow and your body for comfort. This will help keep your arm at your side. 3. Hold one end of the elastic band in the hand of the painful arm. 4. Slowly rotate your forearm toward your body until it touches your belly. Slowly move it back to where you started. 5. Keep your elbow and upper arm firmly tucked against the towel roll or at your side. 6. Repeat 8 to 12 times. External rotator strengthening exercise 1. Start by tying a piece of elastic exercise material to a doorknob. You can use surgical tubing or Thera-Band. (You may also hold one end of the band in each hand.) 2. Stand or sit with your shoulder relaxed and your elbow bent 90 degrees. Your upper arm should rest comfortably against your side. Squeeze a rolled towel between your elbow and your body for comfort. This will help keep your arm at your side. 3. Hold one end of the elastic band with the hand of the painful arm. 4. Start with your forearm across your belly. Slowly rotate the forearm out away from your body. Keep your elbow and upper arm tucked against the towel roll or the side of your body until you begin to feel tightness in your shoulder. Slowly move your arm back to where you started. 5. Repeat 8 to 12 times. Follow-up care is a key part of your treatment and safety. Be sure to make and go to all appointments, and call your doctor if you are having problems. It's also a good idea to know your test results and keep a list of the medicines you take. Where can you learn more? Go to http://www.gray.com/ Enter [employer] in the search box to learn more about \"Rotator Cuff: Exercises. \" Current as of: March 2, 2020               Content Version: 12.6 © 5337-9319 [employer]. Care instructions adapted under license by [employer] (which disclaims liability or warranty for this information). If you have questions about a medical condition or this instruction, always ask your healthcare professional. [address] any warranty or liability for your use of this information.

## 2020-09-24 NOTE — PROGRESS NOTES
Chief Complaint   Patient presents with    Medication Evaluation    Other     6 month f/u. numbness front of left knee. Reviewed record in preparation for visit and have obtained necessary documentation. Identified pt with two pt identifiers(name and ). Health Maintenance Due   Topic    Pneumococcal 0-64 years (1 of 1 - PPSV23)    Flu Vaccine (1)         Chief Complaint   Patient presents with    Medication Evaluation    Other     6 month f/u. numbness front of left knee. Wt Readings from Last 3 Encounters:   20 183 lb 9.6 oz (83.3 kg)   20 186 lb 6.4 oz (84.6 kg)   10/18/19 183 lb (83 kg)     Temp Readings from Last 3 Encounters:   20 98 °F (36.7 °C) (Temporal)   20 98.2 °F (36.8 °C)   19 98.1 °F (36.7 °C) (Oral)     BP Readings from Last 3 Encounters:   20 130/80   20 120/80   10/18/19 122/75     Pulse Readings from Last 3 Encounters:   20 71   20 75   10/18/19 62           Learning Assessment:  :     Learning Assessment 2014   PRIMARY LEARNER Patient   PRIMARY LANGUAGE ENGLISH   LEARNER PREFERENCE PRIMARY DEMONSTRATION     LISTENING   ANSWERED BY patient   RELATIONSHIP SELF       Depression Screening:  :     3 most recent PHQ Screens 2020   Little interest or pleasure in doing things Not at all   Feeling down, depressed, irritable, or hopeless Not at all   Total Score PHQ 2 0       Fall Risk Assessment:  :     No flowsheet data found. Abuse Screening:  :     No flowsheet data found.     Coordination of Care Questionnaire:  :     1) Have you been to an emergency room, urgent care clinic since your last visit? no   Hospitalized since your last visit? no             2) Have you seen or consulted any other health care providers outside of [address] [place] [address] since your last visit? no  (Include any pap smears or colon screenings in this section.)    3) Do you have an Advance Directive on file? no    4) Are you interested in receiving information on Advance Directives? NO      Patient is accompanied by self I have received verbal consent from Homer Romeo to discuss any/all medical information while they are present in the room. Reviewed record  In preparation for visit and have obtained necessary documentation.

## 2020-09-30 DIAGNOSIS — E11.65 TYPE 2 DIABETES MELLITUS WITH HYPERGLYCEMIA (HCC): ICD-10-CM

## 2020-09-30 DIAGNOSIS — E78.00 HYPERCHOLESTEREMIA: ICD-10-CM

## 2020-10-01 RX ORDER — METFORMIN HYDROCHLORIDE 500 MG/1
2000 TABLET, EXTENDED RELEASE ORAL
Qty: 360 TAB | Refills: 1 | Status: SHIPPED | OUTPATIENT
Start: 2020-10-01 | End: 2021-03-24

## 2020-10-01 RX ORDER — ATORVASTATIN CALCIUM 10 MG/1
TABLET, FILM COATED ORAL
Qty: 90 TAB | Refills: 1 | Status: SHIPPED | OUTPATIENT
Start: 2020-10-01 | End: 2021-03-24

## 2020-10-21 NOTE — PROGRESS NOTES
Follow Up Visit    Tommy Kuhn is a 54 y.o. male. he presents for Medication Evaluation and Other (6 month f/u. numbness front of left knee.)    Endocrine Review  He is seen for diabetes. Since last visit: he has been doing well. Home glucose monitoring: is performed regularly, values are usually normal.   He reports medication compliance: compliant all of the time. He reports the following medication side effects: none. Diabetic diet compliance: compliant all of the time. Further diabetic ROS: no hypoglycemia. Lab review: orders written for new lab studies as appropriate; see orders. He also reports having numbness at the front of his left knee. He drives with his knee resting on the car door. He has done this for many years. The numbness is where his knee normally rests. Patient Active Problem List   Diagnosis Code    Diabetes mellitus type 2, uncontrolled, without complications [id number]    Hypercholesteremia E78.00    Allergic rhinitis J30.9    GERD (gastroesophageal reflux disease) K21.9         Prior to Admission medications    Medication Sig Start Date End Date Taking? Authorizing Provider   montelukast (SINGULAIR) 10 mg tablet TAKE 1 TABLET BY MOUTH EVERY DAY 8/19/20  Yes Jorje Foster MD   semaglutide (Ozempic) 1 mg/dose (2 mg/1.5 mL) sub-q pen INJECT 1 MG EVERY 7 DAYS 3/24/20  Yes Jorje Foster MD   levocetirizine (XYZAL) 5 mg tablet Take  by mouth. Yes Provider, Historical   multivitamin (ONE A DAY) tablet Take 1 Tab by mouth daily. Yes Provider, Historical   FAMOTIDINE (PEPCID PO) Take  by mouth nightly. Yes Provider, Historical   LORATADINE/PSEUDOEPHEDRINE (CLARITIN-D 24 HOUR PO) Take  by mouth. Yes Provider, Historical   cholecalciferol, vitamin d3, (VITAMIN D) 1,000 unit tablet Take  by mouth daily.    Yes Provider, Historical   atorvastatin (LIPITOR) 10 mg tablet TAKE 1 TABLET BY MOUTH EVERY DAY 10/1/20   Jorje Foster MD   metFORMIN ER (GLUCOPHAGE XR) 500 mg tablet TAKE 4 TABS BY MOUTH DAILY (WITH DINNER). 10/1/20   Billie Hernandez MD   Blood Sugar Diagnostic, Drum (ACCU-CHEK COMPACT PLUS TEST) strp Test 3-5 times daily as directed. 11/1/19   Billie Hernandez MD   [facility] ULTRA BLUE TEST STRIP strip TEST 3-5 TIMES DAILY AS DIRECTED 10/30/19   Billie Hernandez MD         Health Maintenance   Topic Date Due    Pneumococcal 0-64 years (1 of 1 - PPSV23) 06/30/2008    Flu Vaccine (1) 09/01/2020    Eye Exam Retinal or Dilated  02/25/2021    A1C test (Diabetic or Prediabetic)  02/28/2021    MICROALBUMIN Q1  02/28/2021    Lipid Screen  02/28/2021    Foot Exam Q1  03/24/2021    DTaP/Tdap/Td series (3 - Td) 03/24/2030       Review of Systems   Constitutional: Negative. Respiratory: Negative. Cardiovascular: Negative. Gastrointestinal: Negative. Visit Vitals  /80 (BP 1 Location: Left arm, BP Patient Position: Sitting)   Pulse 71   Temp 98 °F (36.7 °C) (Temporal)   Resp 16   Ht 5' 9.33\" (1.761 m)   Wt 183 lb 9.6 oz (83.3 kg)   SpO2 97%   BMI 26.86 kg/m²       Physical Exam  Constitutional:       Appearance: He is well-developed. Cardiovascular:      Rate and Rhythm: Normal rate and regular rhythm. Pulmonary:      Effort: Pulmonary effort is normal.      Breath sounds: Normal breath sounds. ASSESSMENT/PLAN    Diagnoses and all orders for this visit:    1. Well adult exam  -     LIPID PANEL  -     MICROALBUMIN, UR, RAND W/ MICROALB/CREAT RATIO  -     METABOLIC PANEL, COMPREHENSIVE    2. Prostate cancer screening  -     PSA, DIAGNOSTIC (PROSTATE SPECIFIC AG)    3. Chronic left shoulder pain  -     REFERRAL TO ORTHOPEDICS    4. Type 2 diabetes mellitus without complication, without long-term current use of insulin (HCC)  -     CBC WITH AUTOMATED DIFF  -     HEMOGLOBIN A1C WITH EAG    5. 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   He will try to keep his knee off the door when he drives

## 2021-02-16 DIAGNOSIS — J30.1 SEASONAL ALLERGIC RHINITIS DUE TO POLLEN: ICD-10-CM

## 2021-02-16 RX ORDER — MONTELUKAST SODIUM 10 MG/1
TABLET ORAL
Qty: 90 TAB | Refills: 1 | Status: SHIPPED | OUTPATIENT
Start: 2021-02-16 | End: 2021-08-13

## 2021-03-21 DIAGNOSIS — E11.65 TYPE 2 DIABETES MELLITUS WITH HYPERGLYCEMIA (HCC): ICD-10-CM

## 2021-03-21 DIAGNOSIS — E78.00 HYPERCHOLESTEREMIA: ICD-10-CM

## 2021-03-24 RX ORDER — METFORMIN HYDROCHLORIDE 500 MG/1
2000 TABLET, EXTENDED RELEASE ORAL
Qty: 360 TAB | Refills: 1 | Status: SHIPPED | OUTPATIENT
Start: 2021-03-24 | End: 2021-08-13

## 2021-03-24 RX ORDER — ATORVASTATIN CALCIUM 10 MG/1
TABLET, FILM COATED ORAL
Qty: 90 TAB | Refills: 1 | Status: SHIPPED | OUTPATIENT
Start: 2021-03-24 | End: 2021-11-05

## 2021-03-26 DIAGNOSIS — E11.65 TYPE 2 DIABETES MELLITUS WITH HYPERGLYCEMIA (HCC): ICD-10-CM

## 2021-03-26 RX ORDER — SEMAGLUTIDE 1.34 MG/ML
INJECTION, SOLUTION SUBCUTANEOUS
Qty: 1 BOX | Refills: 3 | Status: SHIPPED | OUTPATIENT
Start: 2021-03-26 | End: 2021-05-06 | Stop reason: SDUPTHER

## 2021-05-06 ENCOUNTER — OFFICE VISIT (OUTPATIENT)
Dept: INTERNAL MEDICINE CLINIC | Age: 46
End: 2021-05-06
Payer: COMMERCIAL

## 2021-05-06 VITALS
BODY MASS INDEX: 27.78 KG/M2 | RESPIRATION RATE: 16 BRPM | SYSTOLIC BLOOD PRESSURE: 122 MMHG | OXYGEN SATURATION: 98 % | DIASTOLIC BLOOD PRESSURE: 70 MMHG | TEMPERATURE: 98 F | WEIGHT: 187.6 LBS | HEART RATE: 71 BPM | HEIGHT: 69 IN

## 2021-05-06 DIAGNOSIS — E78.00 HYPERCHOLESTEREMIA: ICD-10-CM

## 2021-05-06 DIAGNOSIS — Z00.00 WELL ADULT EXAM: Primary | ICD-10-CM

## 2021-05-06 DIAGNOSIS — E11.65 TYPE 2 DIABETES MELLITUS WITH HYPERGLYCEMIA (HCC): ICD-10-CM

## 2021-05-06 LAB
ALBUMIN SERPL-MCNC: 4.4 G/DL (ref 4–5)
ALBUMIN/CREAT UR: 3 MG/G CREAT (ref 0–29)
ALBUMIN/GLOB SERPL: 2.1 {RATIO} (ref 1.2–2.2)
ALP SERPL-CCNC: 102 IU/L (ref 39–117)
ALT SERPL-CCNC: 32 IU/L (ref 0–44)
AST SERPL-CCNC: 26 IU/L (ref 0–40)
BASOPHILS # BLD AUTO: 0 X10E3/UL (ref 0–0.2)
BASOPHILS NFR BLD AUTO: 0 %
BILIRUB SERPL-MCNC: 0.3 MG/DL (ref 0–1.2)
BUN SERPL-MCNC: 20 MG/DL (ref 6–24)
BUN/CREAT SERPL: 17 (ref 9–20)
CALCIUM SERPL-MCNC: 9.4 MG/DL (ref 8.7–10.2)
CHLORIDE SERPL-SCNC: 101 MMOL/L (ref 96–106)
CHOLEST SERPL-MCNC: 106 MG/DL (ref 100–199)
CO2 SERPL-SCNC: 24 MMOL/L (ref 20–29)
CREAT SERPL-MCNC: 1.17 MG/DL (ref 0.76–1.27)
CREAT UR-MCNC: 144.7 MG/DL
EOSINOPHIL # BLD AUTO: 0.1 X10E3/UL (ref 0–0.4)
EOSINOPHIL NFR BLD AUTO: 1 %
ERYTHROCYTE [DISTWIDTH] IN BLOOD BY AUTOMATED COUNT: 12.8 % (ref 11.6–15.4)
EST. AVERAGE GLUCOSE BLD GHB EST-MCNC: 131 MG/DL
GLOBULIN SER CALC-MCNC: 2.1 G/DL (ref 1.5–4.5)
GLUCOSE SERPL-MCNC: 135 MG/DL (ref 65–99)
HBA1C MFR BLD: 6.2 % (ref 4.8–5.6)
HCT VFR BLD AUTO: 44.2 % (ref 37.5–51)
HDLC SERPL-MCNC: 54 MG/DL
HGB BLD-MCNC: 15.4 G/DL (ref 13–17.7)
IMM GRANULOCYTES # BLD AUTO: 0 X10E3/UL (ref 0–0.1)
IMM GRANULOCYTES NFR BLD AUTO: 0 %
LDLC SERPL CALC-MCNC: 38 MG/DL (ref 0–99)
LYMPHOCYTES # BLD AUTO: 2.1 X10E3/UL (ref 0.7–3.1)
LYMPHOCYTES NFR BLD AUTO: 29 %
MCH RBC QN AUTO: 30.8 PG (ref 26.6–33)
MCHC RBC AUTO-ENTMCNC: 34.8 G/DL (ref 31.5–35.7)
MCV RBC AUTO: 88 FL (ref 79–97)
MICROALBUMIN UR-MCNC: 4.7 UG/ML
MONOCYTES # BLD AUTO: 0.6 X10E3/UL (ref 0.1–0.9)
MONOCYTES NFR BLD AUTO: 9 %
NEUTROPHILS # BLD AUTO: 4.4 X10E3/UL (ref 1.4–7)
NEUTROPHILS NFR BLD AUTO: 61 %
PLATELET # BLD AUTO: 184 X10E3/UL (ref 150–450)
POTASSIUM SERPL-SCNC: 4.5 MMOL/L (ref 3.5–5.2)
PROT SERPL-MCNC: 6.5 G/DL (ref 6–8.5)
PSA SERPL-MCNC: 0.3 NG/ML (ref 0–4)
RBC # BLD AUTO: 5 X10E6/UL (ref 4.14–5.8)
SODIUM SERPL-SCNC: 140 MMOL/L (ref 134–144)
TRIGL SERPL-MCNC: 63 MG/DL (ref 0–149)
VLDLC SERPL CALC-MCNC: 14 MG/DL (ref 5–40)
WBC # BLD AUTO: 7.2 X10E3/UL (ref 3.4–10.8)

## 2021-05-06 PROCEDURE — 99396 PREV VISIT EST AGE 40-64: CPT | Performed by: INTERNAL MEDICINE

## 2021-05-06 RX ORDER — SEMAGLUTIDE 1.34 MG/ML
INJECTION, SOLUTION SUBCUTANEOUS
Qty: 3 BOX | Refills: 3 | Status: SHIPPED | OUTPATIENT
Start: 2021-05-06 | End: 2021-05-07 | Stop reason: SDUPTHER

## 2021-05-06 NOTE — PROGRESS NOTES
Comprehensive Physical Examination    Maile Razo is a 54 y.o. male. he presents for a comprehensive physical examination. He had shoulder surgery--improving after this. Had a  rotator cuff tendon    Endocrine Review  He is seen for diabetes. Since last visit: he has been doing well. Home glucose monitoring: is performed regularly, values are usually normal.   He reports medication compliance: compliant all of the time. He reports the following medication side effects: none. Diabetic diet compliance: compliant all of the time. Further diabetic ROS: no chest pain or dyspnea, no numbness, tingling or pain in extremities, no hypoglycemia, weight has decreased. Lab review: labs reviewed and discussed with patient, A1c today is 6.2. Patient Active Problem List    Diagnosis Date Noted    GERD (gastroesophageal reflux disease) 05/09/2014    Allergic rhinitis 02/12/2010    Diabetes mellitus type 2, uncontrolled, without complications 79/02/0094    Hypercholesteremia 12/29/2009     Current Outpatient Medications   Medication Sig Dispense Refill    semaglutide (Ozempic) 1 mg/dose (2 mg/1.5 mL) sub-q pen INJECT 1 MG SUBCUTANEOUSLY EVERY 7 DAYS 1 Box 3    atorvastatin (LIPITOR) 10 mg tablet TAKE 1 TABLET BY MOUTH EVERY DAY 90 Tab 1    metFORMIN ER (GLUCOPHAGE XR) 500 mg tablet TAKE 4 TABS BY MOUTH DAILY (WITH DINNER). 360 Tab 1    montelukast (SINGULAIR) 10 mg tablet TAKE 1 TABLET BY MOUTH EVERY DAY 90 Tab 1    levocetirizine (XYZAL) 5 mg tablet Take  by mouth.  multivitamin (ONE A DAY) tablet Take 1 Tab by mouth daily.  FAMOTIDINE (PEPCID PO) Take  by mouth nightly.  LORATADINE/PSEUDOEPHEDRINE (CLARITIN-D 24 HOUR PO) Take  by mouth.  cholecalciferol, vitamin d3, (VITAMIN D) 1,000 unit tablet Take  by mouth daily.  Blood Sugar Diagnostic, Drum (ACCU-CHEK COMPACT PLUS TEST) strp Test 3-5 times daily as directed.  300 Strip 3    ONETOUCH ULTRA BLUE TEST STRIP strip TEST 3-5 TIMES DAILY AS DIRECTED 300 Strip 3     No Known Allergies  Past Medical History:   Diagnosis Date    Allergic rhinitis     Borderline glaucoma, open angle with borderline findings 2014    Diabetes ([address]     dx at age 34 in 2007    GERD (gastroesophageal reflux disease)     Hypercholesteremia      Past Surgical History:   Procedure Laterality Date    HX ORTHOPAEDIC      Right foot surgery    HX WISDOM TEETH EXTRACTION       Family History   Problem Relation Age of Onset    Diabetes Mother         age 54    Heart Disease Mother         CABG 3    Arthritis-osteo Mother     Elevated Lipids Mother     Hypertension Mother     COPD Mother     Cancer Father         multiple myelom    Elevated Lipids Brother     Heart Disease Maternal Grandmother     Cancer Maternal Grandmother         bone cancer    Diabetes Paternal Grandmother     Hypertension Paternal Grandmother      Social History     Tobacco Use    Smoking status: Never Smoker    Smokeless tobacco: Never Used   Substance Use Topics    Alcohol use: Yes     Alcohol/week: 2.5 standard drinks     Types: 2 Glasses of wine, 1 Cans of beer per week        Health Maintenance   Topic Date Due    Hepatitis C Screening  Never done    COVID-19 Vaccine (1) Never done    Pneumococcal 0-64 years (1 of 1 - PPSV23) Never done    Foot Exam Q1  03/24/2021    A1C test (Diabetic or Prediabetic)  05/05/2022    MICROALBUMIN Q1  05/05/2022    Lipid Screen  05/05/2022    Eye Exam Retinal or Dilated  03/08/2023    DTaP/Tdap/Td series (3 - Td) 03/24/2030    Flu Vaccine  Completed         Review of Systems   Constitutional: Negative. Respiratory: Negative. Cardiovascular: Negative. Genitourinary: Negative.           Visit Vitals  /70 (BP 1 Location: Left arm, BP Patient Position: Sitting, BP Cuff Size: Adult)   Pulse 71   Temp 98 °F (36.7 °C) (Temporal)   Resp 16   Ht 5' 9.33\" (1.761 m)   Wt 187 lb 9.6 oz (85.1 kg)   SpO2 98%   BMI 27.44 kg/m²       Physical Exam  Cardiovascular:      Rate and Rhythm: Normal rate and regular rhythm. Heart sounds: No murmur heard. Pulmonary:      Effort: Pulmonary effort is normal.      Breath sounds: Normal breath sounds. Abdominal:      General: Bowel sounds are normal.      Palpations: Abdomen is soft. Musculoskeletal:      Cervical back: Normal range of motion. Lymphadenopathy:      Cervical: No cervical adenopathy. ASSESSMENT/PLAN  Diagnoses and all orders for this visit:    1. Well adult exam    2. Type 2 diabetes mellitus with hyperglycemia (HCC)    3. Hypercholesteremia      Follow-up and Dispositions    · Return in about 6 months (around 11/6/2021) for Follow up Diabetes.

## 2021-05-06 NOTE — PROGRESS NOTES
Chief Complaint   Patient presents with    Complete Physical     Reviewed record in preparation for visit and have obtained necessary documentation. Identified pt with two pt identifiers(name and ). Health Maintenance Due   Topic    Hepatitis C Screening     COVID-19 Vaccine (1)    Pneumococcal 0-64 years (1 of 1 - PPSV23)    Foot Exam Q1          Chief Complaint   Patient presents with    Complete Physical        Wt Readings from Last 3 Encounters:   21 187 lb 9.6 oz (85.1 kg)   20 183 lb 9.6 oz (83.3 kg)   20 186 lb 6.4 oz (84.6 kg)     Temp Readings from Last 3 Encounters:   21 98 °F (36.7 °C) (Temporal)   20 98 °F (36.7 °C) (Temporal)   20 98.2 °F (36.8 °C)     BP Readings from Last 3 Encounters:   21 122/70   20 130/80   20 120/80     Pulse Readings from Last 3 Encounters:   21 71   20 71   20 75           Learning Assessment:  :     Learning Assessment 2014   PRIMARY LEARNER Patient   PRIMARY LANGUAGE ENGLISH   LEARNER PREFERENCE PRIMARY DEMONSTRATION     LISTENING   ANSWERED BY patient   RELATIONSHIP SELF       Depression Screening:  :     3 most recent PHQ Screens 2021   Little interest or pleasure in doing things Not at all   Feeling down, depressed, irritable, or hopeless Not at all   Total Score PHQ 2 0       Fall Risk Assessment:  :     No flowsheet data found. Abuse Screening:  :     No flowsheet data found. Coordination of Care Questionnaire:  :     1) Have you been to an emergency room, urgent care clinic since your last visit? no   Hospitalized since your last visit? no             2) Have you seen or consulted any other health care providers outside of [address] [place] [address] since your last visit? yes  (Include any pap smears or colon screenings in this section.)    3) Do you have an Advance Directive on file? no    4) Are you interested in receiving information on Advance Directives? NO      Patient is accompanied by self I have received verbal consent from Lyndsay Juares to discuss any/all medical information while they are present in the room. Reviewed record  In preparation for visit and have obtained necessary documentation.

## 2021-05-07 DIAGNOSIS — E11.65 TYPE 2 DIABETES MELLITUS WITH HYPERGLYCEMIA (HCC): ICD-10-CM

## 2021-05-10 RX ORDER — SEMAGLUTIDE 1.34 MG/ML
INJECTION, SOLUTION SUBCUTANEOUS
Qty: 3 BOX | Refills: 3 | Status: SHIPPED | OUTPATIENT
Start: 2021-05-10

## 2021-08-13 DIAGNOSIS — E11.65 TYPE 2 DIABETES MELLITUS WITH HYPERGLYCEMIA (HCC): ICD-10-CM

## 2021-08-13 DIAGNOSIS — J30.1 SEASONAL ALLERGIC RHINITIS DUE TO POLLEN: ICD-10-CM

## 2021-08-13 RX ORDER — METFORMIN HYDROCHLORIDE 500 MG/1
2000 TABLET, EXTENDED RELEASE ORAL
Qty: 360 TABLET | Refills: 1 | Status: SHIPPED | OUTPATIENT
Start: 2021-08-13 | End: 2022-01-21

## 2021-08-13 RX ORDER — MONTELUKAST SODIUM 10 MG/1
TABLET ORAL
Qty: 90 TABLET | Refills: 1 | Status: SHIPPED | OUTPATIENT
Start: 2021-08-13 | End: 2022-01-21

## 2021-11-04 DIAGNOSIS — E78.00 HYPERCHOLESTEREMIA: Primary | ICD-10-CM

## 2021-11-04 DIAGNOSIS — E11.65 TYPE 2 DIABETES MELLITUS WITH HYPERGLYCEMIA, WITHOUT LONG-TERM CURRENT USE OF INSULIN (HCC): ICD-10-CM

## 2021-11-04 DIAGNOSIS — Z12.5 PROSTATE CANCER SCREENING: ICD-10-CM

## 2021-11-05 DIAGNOSIS — E78.00 HYPERCHOLESTEREMIA: ICD-10-CM

## 2021-11-05 RX ORDER — ATORVASTATIN CALCIUM 10 MG/1
TABLET, FILM COATED ORAL
Qty: 90 TABLET | Refills: 1 | Status: SHIPPED | OUTPATIENT
Start: 2021-11-05 | End: 2022-05-10

## 2021-11-10 LAB
ALBUMIN SERPL-MCNC: 4.5 G/DL (ref 4–5)
ALBUMIN/GLOB SERPL: 2.4 {RATIO} (ref 1.2–2.2)
ALP SERPL-CCNC: 84 IU/L (ref 44–121)
ALT SERPL-CCNC: 38 IU/L (ref 0–44)
AST SERPL-CCNC: 28 IU/L (ref 0–40)
BASOPHILS # BLD AUTO: 0 X10E3/UL (ref 0–0.2)
BASOPHILS NFR BLD AUTO: 0 %
BILIRUB SERPL-MCNC: 0.5 MG/DL (ref 0–1.2)
BUN SERPL-MCNC: 18 MG/DL (ref 6–24)
BUN/CREAT SERPL: 14 (ref 9–20)
CALCIUM SERPL-MCNC: 9.6 MG/DL (ref 8.7–10.2)
CHLORIDE SERPL-SCNC: 100 MMOL/L (ref 96–106)
CHOLEST SERPL-MCNC: 117 MG/DL (ref 100–199)
CO2 SERPL-SCNC: 26 MMOL/L (ref 20–29)
CREAT SERPL-MCNC: 1.26 MG/DL (ref 0.76–1.27)
EOSINOPHIL # BLD AUTO: 0.1 X10E3/UL (ref 0–0.4)
EOSINOPHIL NFR BLD AUTO: 1 %
ERYTHROCYTE [DISTWIDTH] IN BLOOD BY AUTOMATED COUNT: 12 % (ref 11.6–15.4)
EST. AVERAGE GLUCOSE BLD GHB EST-MCNC: 134 MG/DL
GLOBULIN SER CALC-MCNC: 1.9 G/DL (ref 1.5–4.5)
GLUCOSE SERPL-MCNC: 138 MG/DL (ref 65–99)
HBA1C MFR BLD: 6.3 % (ref 4.8–5.6)
HCT VFR BLD AUTO: 42.9 % (ref 37.5–51)
HDLC SERPL-MCNC: 61 MG/DL
HGB BLD-MCNC: 14.9 G/DL (ref 13–17.7)
IMM GRANULOCYTES # BLD AUTO: 0 X10E3/UL (ref 0–0.1)
IMM GRANULOCYTES NFR BLD AUTO: 0 %
LDLC SERPL CALC-MCNC: 45 MG/DL (ref 0–99)
LYMPHOCYTES # BLD AUTO: 2.2 X10E3/UL (ref 0.7–3.1)
LYMPHOCYTES NFR BLD AUTO: 34 %
MCH RBC QN AUTO: 31.2 PG (ref 26.6–33)
MCHC RBC AUTO-ENTMCNC: 34.7 G/DL (ref 31.5–35.7)
MCV RBC AUTO: 90 FL (ref 79–97)
MONOCYTES # BLD AUTO: 0.6 X10E3/UL (ref 0.1–0.9)
MONOCYTES NFR BLD AUTO: 9 %
NEUTROPHILS # BLD AUTO: 3.6 X10E3/UL (ref 1.4–7)
NEUTROPHILS NFR BLD AUTO: 56 %
PLATELET # BLD AUTO: 171 X10E3/UL (ref 150–450)
POTASSIUM SERPL-SCNC: 4.9 MMOL/L (ref 3.5–5.2)
PROT SERPL-MCNC: 6.4 G/DL (ref 6–8.5)
PSA SERPL-MCNC: 0.5 NG/ML (ref 0–4)
RBC # BLD AUTO: 4.78 X10E6/UL (ref 4.14–5.8)
SODIUM SERPL-SCNC: 137 MMOL/L (ref 134–144)
TRIGL SERPL-MCNC: 44 MG/DL (ref 0–149)
VLDLC SERPL CALC-MCNC: 11 MG/DL (ref 5–40)
WBC # BLD AUTO: 6.4 X10E3/UL (ref 3.4–10.8)

## 2021-11-11 ENCOUNTER — OFFICE VISIT (OUTPATIENT)
Dept: INTERNAL MEDICINE CLINIC | Age: 46
End: 2021-11-11
Payer: COMMERCIAL

## 2021-11-11 VITALS
SYSTOLIC BLOOD PRESSURE: 130 MMHG | BODY MASS INDEX: 27.91 KG/M2 | HEIGHT: 69 IN | TEMPERATURE: 98.1 F | DIASTOLIC BLOOD PRESSURE: 78 MMHG | HEART RATE: 73 BPM | RESPIRATION RATE: 16 BRPM | WEIGHT: 188.4 LBS | OXYGEN SATURATION: 97 %

## 2021-11-11 DIAGNOSIS — E78.00 HYPERCHOLESTEREMIA: ICD-10-CM

## 2021-11-11 DIAGNOSIS — Z00.00 WELL ADULT EXAM: Primary | ICD-10-CM

## 2021-11-11 DIAGNOSIS — Z79.4 TYPE 2 DIABETES MELLITUS WITH HYPERGLYCEMIA, WITH LONG-TERM CURRENT USE OF INSULIN (HCC): ICD-10-CM

## 2021-11-11 DIAGNOSIS — E11.65 TYPE 2 DIABETES MELLITUS WITH HYPERGLYCEMIA, WITH LONG-TERM CURRENT USE OF INSULIN (HCC): ICD-10-CM

## 2021-11-11 DIAGNOSIS — Z12.11 COLON CANCER SCREENING: ICD-10-CM

## 2021-11-11 PROCEDURE — 99396 PREV VISIT EST AGE 40-64: CPT | Performed by: INTERNAL MEDICINE

## 2021-11-11 RX ORDER — MELOXICAM 15 MG/1
TABLET ORAL
COMMUNITY
Start: 2021-10-28

## 2021-11-11 NOTE — PROGRESS NOTES
Chief Complaint   Patient presents with    Diabetes     Reviewed record in preparation for visit and have obtained necessary documentation. Identified pt with two pt identifiers(name and ). Health Maintenance Due   Topic    Hepatitis C Screening     Colorectal Cancer Screening Combo     Foot Exam Q1     Flu Vaccine (1)         Chief Complaint   Patient presents with    Diabetes        Wt Readings from Last 3 Encounters:   21 188 lb 6.4 oz (85.5 kg)   21 187 lb 9.6 oz (85.1 kg)   20 183 lb 9.6 oz (83.3 kg)     Temp Readings from Last 3 Encounters:   21 98.1 °F (36.7 °C) (Temporal)   21 98 °F (36.7 °C) (Temporal)   20 98 °F (36.7 °C) (Temporal)     BP Readings from Last 3 Encounters:   21 130/78   21 122/70   20 130/80     Pulse Readings from Last 3 Encounters:   21 73   21 71   20 71           Learning Assessment:  :     Learning Assessment 2014   PRIMARY LEARNER Patient   PRIMARY LANGUAGE ENGLISH   LEARNER PREFERENCE PRIMARY DEMONSTRATION     LISTENING   ANSWERED BY patient   RELATIONSHIP SELF       Depression Screening:  :     3 most recent PHQ Screens 2021   Little interest or pleasure in doing things Not at all   Feeling down, depressed, irritable, or hopeless Not at all   Total Score PHQ 2 0       Fall Risk Assessment:  :     No flowsheet data found. Abuse Screening:  :     No flowsheet data found. Coordination of Care Questionnaire:  :     1) Have you been to an emergency room, urgent care clinic since your last visit? no   Hospitalized since your last visit? no             2) Have you seen or consulted any other health care providers outside of [address] [place] [address] since your last visit? yes  (Include any pap smears or colon screenings in this section.)    3) Do you have an Advance Directive on file? no    4) Are you interested in receiving information on Advance Directives?  NO      Patient is accompanied by self I have received verbal consent from Moon Villanueva to discuss any/all medical information while they are present in the room. Reviewed record  In preparation for visit and have obtained necessary documentation.

## 2021-11-11 NOTE — PROGRESS NOTES
Follow Up Visit    Yumiko Partida is a 51 y.o. male. he presents for Diabetes    He had an injury to his achilles. He was in a boot for a few weeks. Now better, back to running. He is feeling well. Endocrine Review  He is seen for diabetes. Since last visit: he has been doing very well. Home glucose monitoring: is performed regularly, values are usually normal.   He reports medication compliance: compliant all of the time. He reports the following medication side effects: none. Diabetic diet compliance: compliant all of the time. Further diabetic ROS: no chest pain, dyspnea or TIA's, no hypoglycemia. Lab review: labs reviewed and discussed with patient, A1c today is 6.3. Patient Active Problem List   Diagnosis Code    Diabetes mellitus type 2, uncontrolled, without complications [id number]    Hypercholesteremia E78.00    Allergic rhinitis J30.9    GERD (gastroesophageal reflux disease) K21.9         Prior to Admission medications    Medication Sig Start Date End Date Taking? Authorizing Provider   atorvastatin (LIPITOR) 10 mg tablet TAKE 1 TABLET BY MOUTH EVERY DAY 11/5/21  Yes Kathyanne Collet, MD   montelukast (SINGULAIR) 10 mg tablet TAKE 1 TABLET BY MOUTH EVERY DAY 8/13/21  Yes Kathyanne Collet, MD   metFORMIN ER (GLUCOPHAGE XR) 500 mg tablet TAKE 4 TABS BY MOUTH DAILY (WITH DINNER). 8/13/21  Yes Kathyanne Collet, MD   semaglutide (Ozempic) 1 mg/dose (2 mg/1.5 mL) sub-q pen INJECT 1 MG SUBCUTANEOUSLY EVERY 7 DAYS 5/10/21  Yes Kathyanne Collet, MD   levocetirizine (XYZAL) 5 mg tablet Take  by mouth. Yes Provider, Historical   multivitamin (ONE A DAY) tablet Take 1 Tab by mouth daily. Yes Provider, Historical   FAMOTIDINE (PEPCID PO) Take  by mouth nightly. Yes Provider, Historical   cholecalciferol, vitamin d3, (VITAMIN D) 1,000 unit tablet Take  by mouth daily.    Yes Provider, Historical   meloxicam (MOBIC) 15 mg tablet As needed 10/28/21   Provider, Historical   Blood Sugar Diagnostic, Drum (ACCU-CHEK COMPACT PLUS TEST) strp Test 3-5 times daily as directed. Patient not taking: Reported on 11/11/2021 11/1/19   Ana Lilia Alex MD   [facility] ULTRA BLUE TEST STRIP strip TEST 3-5 TIMES DAILY AS DIRECTED  Patient not taking: Reported on 11/11/2021 10/30/19   Ana Lilia Alex MD   LORATADINE/PSEUDOEPHEDRINE (CLARITIN-D 24 HOUR PO) Take  by mouth. Provider, Historical         Health Maintenance   Topic Date Due    Hepatitis C Screening  Never done    Colorectal Cancer Screening Combo  Never done    Foot Exam Q1  03/24/2021    Flu Vaccine (1) 09/01/2021    MICROALBUMIN Q1  05/05/2022    A1C test (Diabetic or Prediabetic)  11/09/2022    Lipid Screen  11/09/2022    Eye Exam Retinal or Dilated  03/08/2023    DTaP/Tdap/Td series (3 - Td or Tdap) 03/24/2030    COVID-19 Vaccine  Completed    Pneumococcal 0-64 years  Aged Out       Review of Systems   Constitutional: Negative. Respiratory: Negative. Cardiovascular: Negative. Gastrointestinal: Negative. Visit Vitals  /78 (BP 1 Location: Left arm, BP Patient Position: Sitting, BP Cuff Size: Adult)   Pulse 73   Temp 98.1 °F (36.7 °C) (Temporal)   Resp 16   Ht 5' 9.33\" (1.761 m)   Wt 188 lb 6.4 oz (85.5 kg)   SpO2 97%   BMI 27.56 kg/m²       Physical Exam  Constitutional:       Appearance: Normal appearance. Cardiovascular:      Rate and Rhythm: Normal rate and regular rhythm. Pulses: Normal pulses. Heart sounds: Normal heart sounds. Pulmonary:      Effort: Pulmonary effort is normal.      Breath sounds: Normal breath sounds. Neurological:      Mental Status: He is alert. ASSESSMENT/PLAN    Diagnoses and all orders for this visit:    1. Well adult exam    2. Colon cancer screening  -     REFERRAL TO GASTROENTEROLOGY    3. Type 2 diabetes mellitus with hyperglycemia, with long-term current use of insulin ([address]    4. Hypercholesteremia      The patient is doing well. Continue therapy. No changes at this time.

## 2022-01-21 DIAGNOSIS — E11.65 TYPE 2 DIABETES MELLITUS WITH HYPERGLYCEMIA (HCC): ICD-10-CM

## 2022-01-21 DIAGNOSIS — J30.1 SEASONAL ALLERGIC RHINITIS DUE TO POLLEN: ICD-10-CM

## 2022-01-21 RX ORDER — MONTELUKAST SODIUM 10 MG/1
TABLET ORAL
Qty: 90 TABLET | Refills: 1 | Status: SHIPPED | OUTPATIENT
Start: 2022-01-21 | End: 2022-07-18

## 2022-01-21 RX ORDER — METFORMIN HYDROCHLORIDE 500 MG/1
2000 TABLET, EXTENDED RELEASE ORAL
Qty: 360 TABLET | Refills: 1 | Status: SHIPPED | OUTPATIENT
Start: 2022-01-21 | End: 2022-07-18

## 2022-05-05 DIAGNOSIS — E78.00 HYPERCHOLESTEREMIA: ICD-10-CM

## 2022-05-09 ENCOUNTER — OFFICE VISIT (OUTPATIENT)
Dept: INTERNAL MEDICINE CLINIC | Age: 47
End: 2022-05-09
Payer: COMMERCIAL

## 2022-05-09 VITALS
TEMPERATURE: 97.5 F | BODY MASS INDEX: 27.85 KG/M2 | HEART RATE: 71 BPM | DIASTOLIC BLOOD PRESSURE: 86 MMHG | RESPIRATION RATE: 16 BRPM | HEIGHT: 69 IN | OXYGEN SATURATION: 97 % | SYSTOLIC BLOOD PRESSURE: 137 MMHG | WEIGHT: 188 LBS

## 2022-05-09 DIAGNOSIS — Z11.59 ENCOUNTER FOR HEPATITIS C SCREENING TEST FOR LOW RISK PATIENT: ICD-10-CM

## 2022-05-09 DIAGNOSIS — Z79.4 TYPE 2 DIABETES MELLITUS WITH HYPERGLYCEMIA, WITH LONG-TERM CURRENT USE OF INSULIN (HCC): Primary | ICD-10-CM

## 2022-05-09 DIAGNOSIS — E11.65 TYPE 2 DIABETES MELLITUS WITH HYPERGLYCEMIA, WITH LONG-TERM CURRENT USE OF INSULIN (HCC): Primary | ICD-10-CM

## 2022-05-09 DIAGNOSIS — E78.00 HYPERCHOLESTEREMIA: ICD-10-CM

## 2022-05-09 PROCEDURE — 99214 OFFICE O/P EST MOD 30 MIN: CPT | Performed by: INTERNAL MEDICINE

## 2022-05-09 RX ORDER — DICLOFENAC SODIUM 10 MG/G
GEL TOPICAL 4 TIMES DAILY
COMMUNITY

## 2022-05-09 NOTE — PROGRESS NOTES
Follow Up Visit    Efren Asher is a 51 y.o. male. he presents for Diabetes    Endocrine Review  He is seen for diabetes. Since last visit: he has continued to appropriately manage his sugars. He has noted that with exercise, his sugars tend to increase somewhat. Home glucose monitoring: is performed regularly. He reports medication compliance: compliant all of the time. He reports the following medication side effects: none. Diabetic diet compliance: compliant all of the time. Further diabetic ROS: no chest pain or dyspnea, no unusual visual symptoms, no hypoglycemia. Lab review: orders written for new lab studies as appropriate; see orders. Eye exam: pending. He has had some foot discomfort. Follows up with podiatry. We discussed that he will continue this        Patient Active Problem List   Diagnosis Code    Diabetes mellitus type 2, uncontrolled, without complications [id number]    Hypercholesteremia E78.00    Allergic rhinitis J30.9    GERD (gastroesophageal reflux disease) K21.9         Prior to Admission medications    Medication Sig Start Date End Date Taking? Authorizing Provider   diclofenac (Voltaren) 1 % gel Apply  to affected area four (4) times daily. Yes Provider, Historical   metFORMIN ER (GLUCOPHAGE XR) 500 mg tablet TAKE 4 TABS BY MOUTH DAILY (WITH DINNER). 1/21/22  Yes Morelia Rios MD   montelukast (SINGULAIR) 10 mg tablet TAKE 1 TABLET BY MOUTH EVERY DAY 1/21/22  Yes Morelia Rios MD   meloxicam [facility]) 15 mg tablet As needed 10/28/21  Yes Provider, Historical   atorvastatin (LIPITOR) 10 mg tablet TAKE 1 TABLET BY MOUTH EVERY DAY 11/5/21  Yes Morelia Rios MD   semaglutide (Ozempic) 1 mg/dose (2 mg/1.5 mL) sub-q pen INJECT 1 MG SUBCUTANEOUSLY EVERY 7 DAYS 5/10/21  Yes Morelia Rios MD   levocetirizine (XYZAL) 5 mg tablet Take  by mouth. Yes Provider, Historical   multivitamin (ONE A DAY) tablet Take 1 Tab by mouth daily.    Yes Provider, Historical FAMOTIDINE (PEPCID PO) Take  by mouth nightly. Yes Provider, Historical   LORATADINE/PSEUDOEPHEDRINE (CLARITIN-D 24 HOUR PO) Take  by mouth. Yes Provider, Historical   cholecalciferol, vitamin d3, (VITAMIN D) 1,000 unit tablet Take  by mouth daily. Yes Provider, Historical   Blood Sugar Diagnostic, Drum (ACCU-CHEK COMPACT PLUS TEST) strp Test 3-5 times daily as directed. Patient not taking: Reported on 11/11/2021 11/1/19   Zelalem Carson MD   [facility] ULTRA BLUE TEST STRIP strip TEST 3-5 TIMES DAILY AS DIRECTED  Patient not taking: Reported on 11/11/2021 10/30/19   Zelalem Carson MD         Health Maintenance   Topic Date Due    Hepatitis C Screening  Never done    Pneumococcal 0-64 years (1 - PCV) Never done    Colorectal Cancer Screening Combo  Never done    Foot Exam Q1  03/24/2021    COVID-19 Vaccine (3 - Booster for Moderna series) 07/24/2021    MICROALBUMIN Q1  05/05/2022    A1C test (Diabetic or Prediabetic)  11/09/2022    Lipid Screen  11/09/2022    Depression Screen  11/11/2022    Eye Exam Retinal or Dilated  03/08/2023    DTaP/Tdap/Td series (3 - Td or Tdap) 03/24/2030    Flu Vaccine  Completed       Review of Systems   Constitutional: Negative. Respiratory: Negative. Cardiovascular: Negative. Gastrointestinal: Negative. Visit Vitals  /86 (BP 1 Location: Left arm, BP Patient Position: Sitting, BP Cuff Size: Adult)   Pulse 71   Temp 97.5 °F (36.4 °C) (Temporal)   Resp 16   Ht 5' 9.33\" (1.761 m)   Wt 188 lb (85.3 kg)   SpO2 97%   BMI 27.50 kg/m²       Physical Exam  Constitutional:       Appearance: Normal appearance. Cardiovascular:      Rate and Rhythm: Normal rate and regular rhythm. Pulses: Normal pulses. Heart sounds: Normal heart sounds. Pulmonary:      Effort: Pulmonary effort is normal.      Breath sounds: Normal breath sounds. Neurological:      Mental Status: He is alert.            ASSESSMENT/PLAN    Diagnoses and all orders for this visit:    1. Type 2 diabetes mellitus with hyperglycemia, with long-term current use of insulin (HCC)  -     MICROALBUMIN, UR, RAND W/ MICROALB/CREAT RATIO; Future  -     HEMOGLOBIN A1C WITH EAG; Future    2. Hypercholesteremia  -     LIPID PANEL; Future  -     METABOLIC PANEL, COMPREHENSIVE; Future    3. Encounter for hepatitis C screening test for low risk patient  -     HEPATITIS C AB;  Future

## 2022-05-09 NOTE — PROGRESS NOTES
Verified name and birth date for privacy precautions. Chart reviewed in preparation for today's visit. Chief Complaint   Patient presents with    Diabetes          Health Maintenance Due   Topic    Hepatitis C Screening     Pneumococcal 0-64 years (1 - PCV)    Colorectal Cancer Screening Combo     Foot Exam Q1     COVID-19 Vaccine (3 - Booster for Moderna series)    MICROALBUMIN Q1          Wt Readings from Last 3 Encounters:   05/09/22 188 lb (85.3 kg)   11/11/21 188 lb 6.4 oz (85.5 kg)   05/06/21 187 lb 9.6 oz (85.1 kg)     Temp Readings from Last 3 Encounters:   05/09/22 97.5 °F (36.4 °C) (Temporal)   11/11/21 98.1 °F (36.7 °C) (Temporal)   05/06/21 98 °F (36.7 °C) (Temporal)     BP Readings from Last 3 Encounters:   05/09/22 137/86   11/11/21 130/78   05/06/21 122/70     Pulse Readings from Last 3 Encounters:   05/09/22 71   11/11/21 73   05/06/21 71         Learning Assessment:  :     Learning Assessment 5/9/2014   PRIMARY LEARNER Patient   PRIMARY LANGUAGE ENGLISH   LEARNER PREFERENCE PRIMARY DEMONSTRATION     LISTENING   ANSWERED BY patient   RELATIONSHIP SELF       Depression Screening:  :     3 most recent PHQ Screens 5/9/2022   Little interest or pleasure in doing things Not at all   Feeling down, depressed, irritable, or hopeless Not at all   Total Score PHQ 2 0       Fall Risk Assessment:  :     No flowsheet data found. Abuse Screening:  :     Abuse Screening Questionnaire 5/9/2022   Do you ever feel afraid of your partner? N   Are you in a relationship with someone who physically or mentally threatens you? N   Is it safe for you to go home?  Darolyn Merlin

## 2022-05-10 RX ORDER — ATORVASTATIN CALCIUM 10 MG/1
TABLET, FILM COATED ORAL
Qty: 90 TABLET | Refills: 1 | Status: SHIPPED | OUTPATIENT
Start: 2022-05-10 | End: 2022-09-21

## 2022-06-01 ENCOUNTER — TELEPHONE (OUTPATIENT)
Dept: INTERNAL MEDICINE CLINIC | Age: 47
End: 2022-06-01

## 2022-07-18 DIAGNOSIS — E11.65 TYPE 2 DIABETES MELLITUS WITH HYPERGLYCEMIA (HCC): ICD-10-CM

## 2022-07-18 DIAGNOSIS — J30.1 SEASONAL ALLERGIC RHINITIS DUE TO POLLEN: ICD-10-CM

## 2022-07-18 RX ORDER — MONTELUKAST SODIUM 10 MG/1
TABLET ORAL
Qty: 90 TABLET | Refills: 0 | Status: SHIPPED | OUTPATIENT
Start: 2022-07-18 | End: 2022-09-21

## 2022-07-18 RX ORDER — METFORMIN HYDROCHLORIDE 500 MG/1
2000 TABLET, EXTENDED RELEASE ORAL
Qty: 360 TABLET | Refills: 0 | Status: SHIPPED | OUTPATIENT
Start: 2022-07-18 | End: 2022-10-24

## 2022-07-18 RX ORDER — SEMAGLUTIDE 1.34 MG/ML
INJECTION, SOLUTION SUBCUTANEOUS
Qty: 1 EACH | Refills: 0 | Status: SHIPPED | OUTPATIENT
Start: 2022-07-18 | End: 2022-07-22

## 2022-07-21 DIAGNOSIS — E11.65 TYPE 2 DIABETES MELLITUS WITH HYPERGLYCEMIA (HCC): ICD-10-CM

## 2022-07-22 RX ORDER — SEMAGLUTIDE 1.34 MG/ML
INJECTION, SOLUTION SUBCUTANEOUS
Qty: 3 EACH | Refills: 2 | Status: SHIPPED | OUTPATIENT
Start: 2022-07-22

## 2022-09-21 DIAGNOSIS — J30.1 SEASONAL ALLERGIC RHINITIS DUE TO POLLEN: ICD-10-CM

## 2022-09-21 DIAGNOSIS — E78.00 HYPERCHOLESTEREMIA: ICD-10-CM

## 2022-09-21 RX ORDER — MONTELUKAST SODIUM 10 MG/1
TABLET ORAL
Qty: 90 TABLET | Refills: 0 | Status: SHIPPED | OUTPATIENT
Start: 2022-09-21

## 2022-09-21 RX ORDER — ATORVASTATIN CALCIUM 10 MG/1
TABLET, FILM COATED ORAL
Qty: 90 TABLET | Refills: 1 | Status: SHIPPED | OUTPATIENT
Start: 2022-09-21

## 2022-11-14 ENCOUNTER — OFFICE VISIT (OUTPATIENT)
Dept: INTERNAL MEDICINE CLINIC | Age: 47
End: 2022-11-14
Payer: COMMERCIAL

## 2022-11-14 VITALS
TEMPERATURE: 98.5 F | RESPIRATION RATE: 16 BRPM | OXYGEN SATURATION: 98 % | HEART RATE: 71 BPM | SYSTOLIC BLOOD PRESSURE: 130 MMHG | WEIGHT: 186.4 LBS | DIASTOLIC BLOOD PRESSURE: 60 MMHG | BODY MASS INDEX: 27.61 KG/M2 | HEIGHT: 69 IN

## 2022-11-14 DIAGNOSIS — Z00.00 WELL ADULT EXAM: Primary | ICD-10-CM

## 2022-11-14 DIAGNOSIS — E11.65 TYPE 2 DIABETES MELLITUS WITH HYPERGLYCEMIA, UNSPECIFIED WHETHER LONG TERM INSULIN USE (HCC): ICD-10-CM

## 2022-11-14 PROCEDURE — 99396 PREV VISIT EST AGE 40-64: CPT | Performed by: INTERNAL MEDICINE

## 2022-11-14 RX ORDER — FLASH GLUCOSE SCANNING READER
1 EACH MISCELLANEOUS 3 TIMES DAILY
Qty: 1 EACH | Refills: 0 | Status: SHIPPED | OUTPATIENT
Start: 2022-11-14

## 2022-11-14 RX ORDER — FLASH GLUCOSE SENSOR
1 KIT MISCELLANEOUS 3 TIMES DAILY
Qty: 1 KIT | Refills: 0 | Status: SHIPPED | OUTPATIENT
Start: 2022-11-14

## 2022-11-14 NOTE — PROGRESS NOTES
Comprehensive Physical Examination    Leandro Piedra is a 49 y.o. male. he presents for a comprehensive physical examination. Endocrine Review  He is seen for diabetes. Since last visit: he has been doing very well. Home glucose monitoring: is performed regularly, values are usually normal.   He reports medication compliance: compliant all of the time. He reports the following medication side effects: none. Diabetic diet compliance: compliant all of the time. Further diabetic ROS: no chest pain or dyspnea, no numbness, tingling or pain in extremities, he had been training and had run a marathon. He noted that he had some episodes of hypoglycemia during this . Lab review: orders written for new lab studies as appropriate; see orders. Eye exam: he has had an examination. Otherwise, feeling well. Patient Active Problem List    Diagnosis Date Noted    GERD (gastroesophageal reflux disease) 05/09/2014    Allergic rhinitis 02/12/2010    Diabetes mellitus type 2, uncontrolled, without complications 40/62/4264    Hypercholesteremia 12/29/2009     Current Outpatient Medications   Medication Sig Dispense Refill    metFORMIN ER (GLUCOPHAGE XR) 500 mg tablet TAKE 4 TABLETS BY MOUTH EVERY DAY WITH DINNER 360 Tablet 1    montelukast (SINGULAIR) 10 mg tablet TAKE 1 TABLET BY MOUTH EVERY DAY 90 Tablet 0    atorvastatin (LIPITOR) 10 mg tablet TAKE 1 TABLET BY MOUTH EVERY DAY 90 Tablet 1    semaglutide (Ozempic) 1 mg/dose (4 mg/3 mL) pnij INJECT 1 MG UNDER THE SKIN ONCE WEEKLY 3 Each 2    diclofenac (VOLTAREN) 1 % gel Apply  to affected area four (4) times daily. meloxicam (MOBIC) 15 mg tablet As needed      levocetirizine (XYZAL) 5 mg tablet Take  by mouth.      multivitamin (ONE A DAY) tablet Take 1 Tab by mouth daily. FAMOTIDINE (PEPCID PO) Take  by mouth nightly. cholecalciferol (VITAMIN D3) (1000 Units /25 mcg) tablet Take  by mouth daily.       semaglutide (Ozempic) 1 mg/dose (2 mg/1.5 mL) sub-q pen INJECT 1 MG SUBCUTANEOUSLY EVERY 7 DAYS (Patient not taking: Reported on 11/14/2022) 3 Box 3    Blood Sugar Diagnostic, Drum (ACCU-CHEK COMPACT PLUS TEST) strp Test 3-5 times daily as directed. (Patient not taking: Reported on 11/11/2021) 300 Strip 3    ONETOUCH ULTRA BLUE TEST STRIP strip TEST 3-5 TIMES DAILY AS DIRECTED (Patient not taking: No sig reported) 300 Strip 3    LORATADINE/PSEUDOEPHEDRINE (CLARITIN-D 24 HOUR PO) Take  by mouth. No Known Allergies  Past Medical History:   Diagnosis Date    Allergic rhinitis     Borderline glaucoma, open angle with borderline findings 2014    Diabetes ([address]     dx at age 34 in 2007    GERD (gastroesophageal reflux disease)     Hypercholesteremia      Past Surgical History:   Procedure Laterality Date    HX ORTHOPAEDIC      Right foot surgery    HX WISDOM TEETH EXTRACTION       Family History   Problem Relation Age of Onset    Diabetes Mother         age 55    Heart Disease Mother         CABG 3    OSTEOARTHRITIS Mother     Elevated Lipids Mother     Hypertension Mother     COPD Mother     Cancer Father         multiple myelom    Elevated Lipids Brother     Heart Disease Maternal Grandmother     Cancer Maternal Grandmother         bone cancer    Diabetes Paternal Grandmother     Hypertension Paternal Grandmother      Social History     Tobacco Use    Smoking status: Never    Smokeless tobacco: Never   Substance Use Topics    Alcohol use:  Yes     Alcohol/week: 2.5 standard drinks     Types: 2 Glasses of wine, 1 Cans of beer per week        Health Maintenance   Topic Date Due    Hepatitis C Screening  Never done    Pneumococcal 0-64 years (1 - PCV) Never done    Hepatitis B Vaccine (1 of 3 - Risk 3-dose series) Never done    Colorectal Cancer Screening Combo  Never done    Foot Exam Q1  03/24/2021    COVID-19 Vaccine (3 - Booster for Moderna series) 04/21/2021    MICROALBUMIN Q1  05/05/2022    Flu Vaccine (1) 08/01/2022    A1C test (Diabetic or Prediabetic) 11/09/2022    Lipid Screen  11/09/2022    Eye Exam Retinal or Dilated  03/08/2023    Depression Screen  05/09/2023    DTaP/Tdap/Td series (3 - Td or Tdap) 03/24/2030         Review of Systems   Constitutional: Negative. HENT: Negative. Respiratory:  Negative for cough. Cardiovascular:  Negative for chest pain and palpitations. Gastrointestinal: Negative. Musculoskeletal: Negative. All other systems reviewed and are negative. Visit Vitals  /60   Pulse 71   Temp 98.5 °F (36.9 °C) (Temporal)   Resp 16   Ht 5' 9.33\" (1.761 m)   Wt 186 lb 6.4 oz (84.6 kg)   SpO2 98%   BMI 27.27 kg/m²       Physical Exam  Constitutional:       General: He is not in acute distress. Cardiovascular:      Rate and Rhythm: Normal rate and regular rhythm. Heart sounds: No murmur heard. Pulmonary:      Effort: Pulmonary effort is normal.      Breath sounds: Normal breath sounds. Abdominal:      General: Bowel sounds are normal.      Palpations: Abdomen is soft. Musculoskeletal:      Cervical back: Normal range of motion. Lymphadenopathy:      Cervical: No cervical adenopathy. Neurological:      General: No focal deficit present. ASSESSMENT/PLAN  Diagnoses and all orders for this visit:    1. Well adult exam  -     flash glucose sensor (FreeStyle Tawny 2 Sensor) kit; 1 Each by Does Not Apply route three (3) times daily. -     flash glucose scanning reader (FreeStyle Tawny 2 [place]) misc; 1 Each by Does Not Apply route three (3) times daily.     2. Type 2 diabetes mellitus with hyperglycemia, unspecified whether long term insulin use (HCC)    Other orders  -     METABOLIC PANEL, COMPREHENSIVE  -     LIPID PANEL  -     MICROALBUMIN, UR, RAND W/ MICROALB/CREAT RATIO  -     HEMOGLOBIN A1C WITH EAG  -     HEPATITIS C AB

## 2022-11-15 LAB
ALBUMIN SERPL-MCNC: 4.6 G/DL (ref 4–5)
ALBUMIN/CREAT UR: <6 MG/G CREAT (ref 0–29)
ALBUMIN/GLOB SERPL: 2.3 {RATIO} (ref 1.2–2.2)
ALP SERPL-CCNC: 95 IU/L (ref 44–121)
ALT SERPL-CCNC: 45 IU/L (ref 0–44)
AST SERPL-CCNC: 40 IU/L (ref 0–40)
BILIRUB SERPL-MCNC: 0.5 MG/DL (ref 0–1.2)
BUN SERPL-MCNC: 17 MG/DL (ref 6–24)
BUN/CREAT SERPL: 17 (ref 9–20)
CALCIUM SERPL-MCNC: 9.6 MG/DL (ref 8.7–10.2)
CHLORIDE SERPL-SCNC: 103 MMOL/L (ref 96–106)
CHOLEST SERPL-MCNC: 124 MG/DL (ref 100–199)
CO2 SERPL-SCNC: 26 MMOL/L (ref 20–29)
CREAT SERPL-MCNC: 1.03 MG/DL (ref 0.76–1.27)
CREAT UR-MCNC: 51.8 MG/DL
EGFR: 90 ML/MIN/1.73
EST. AVERAGE GLUCOSE BLD GHB EST-MCNC: 143 MG/DL
GLOBULIN SER CALC-MCNC: 2 G/DL (ref 1.5–4.5)
GLUCOSE SERPL-MCNC: 142 MG/DL (ref 70–99)
HBA1C MFR BLD: 6.6 % (ref 4.8–5.6)
HCV AB S/CO SERPL IA: 0.1 S/CO RATIO (ref 0–0.9)
HDLC SERPL-MCNC: 64 MG/DL
LDLC SERPL CALC-MCNC: 45 MG/DL (ref 0–99)
MICROALBUMIN UR-MCNC: <3 UG/ML
POTASSIUM SERPL-SCNC: 4.8 MMOL/L (ref 3.5–5.2)
PROT SERPL-MCNC: 6.6 G/DL (ref 6–8.5)
SODIUM SERPL-SCNC: 141 MMOL/L (ref 134–144)
TRIGL SERPL-MCNC: 71 MG/DL (ref 0–149)
VLDLC SERPL CALC-MCNC: 15 MG/DL (ref 5–40)

## 2022-11-19 DIAGNOSIS — E11.65 TYPE 2 DIABETES MELLITUS WITH HYPERGLYCEMIA (HCC): ICD-10-CM

## 2022-11-21 RX ORDER — SEMAGLUTIDE 1.34 MG/ML
INJECTION, SOLUTION SUBCUTANEOUS
Qty: 2 EACH | Refills: 2 | Status: SHIPPED | OUTPATIENT
Start: 2022-11-21

## 2022-12-08 DIAGNOSIS — Z00.00 WELL ADULT EXAM: ICD-10-CM

## 2022-12-08 RX ORDER — FLASH GLUCOSE SENSOR
1 KIT MISCELLANEOUS 3 TIMES DAILY
Qty: 2 KIT | Refills: 3 | Status: SHIPPED | OUTPATIENT
Start: 2022-12-08

## 2023-01-02 DIAGNOSIS — J30.1 SEASONAL ALLERGIC RHINITIS DUE TO POLLEN: ICD-10-CM

## 2023-01-10 RX ORDER — MONTELUKAST SODIUM 10 MG/1
TABLET ORAL
Qty: 90 TABLET | Refills: 0 | Status: SHIPPED | OUTPATIENT
Start: 2023-01-10

## 2023-01-21 ENCOUNTER — TELEPHONE (OUTPATIENT)
Dept: INTERNAL MEDICINE CLINIC | Age: 48
End: 2023-01-21

## 2023-01-23 NOTE — TELEPHONE ENCOUNTER
On call message 1/21  Patient with covid symptoms started 2 days ago, positive test today  Interested in paxlovid, New med dosing and potential side effects discussed   Advised to hold statin   Isolation guidelines per CDC were discussed  We discussed symptomatic home care regimen  Advised on monitoring vitals at home and discussed warning signs for worsening symptoms that should prompt urgent medical attention  Patient encouraged to contact me with any further concerns, questions, or failure to improve

## 2023-03-08 DIAGNOSIS — E11.65 TYPE 2 DIABETES MELLITUS WITH HYPERGLYCEMIA (HCC): ICD-10-CM

## 2023-03-08 RX ORDER — SEMAGLUTIDE 1.34 MG/ML
INJECTION, SOLUTION SUBCUTANEOUS
Qty: 3 EACH | Refills: 2 | Status: SHIPPED | OUTPATIENT
Start: 2023-03-08

## 2023-05-09 ENCOUNTER — TELEPHONE (OUTPATIENT)
Age: 48
End: 2023-05-09

## 2023-05-09 DIAGNOSIS — E11.65 TYPE 2 DIABETES MELLITUS WITH HYPERGLYCEMIA, WITHOUT LONG-TERM CURRENT USE OF INSULIN (HCC): ICD-10-CM

## 2023-05-09 DIAGNOSIS — Z12.11 ENCOUNTER FOR SCREENING FOR MALIGNANT NEOPLASM OF COLON: Primary | ICD-10-CM

## 2023-05-09 DIAGNOSIS — E78.00 PURE HYPERCHOLESTEROLEMIA, UNSPECIFIED: ICD-10-CM

## 2023-05-09 NOTE — TELEPHONE ENCOUNTER
----- Message from Sharita Roberson sent at 5/9/2023 12:03 PM EDT -----  Regarding: [facility] Orders  Contact: [phone]  Good afternoon. I have my regularly scheduled appointment on May 15th. Do I need to have my blood drawn at [facility] prior to this appointment?   If I do, have the orders been sent over to Hemal[facility] Leal

## 2023-05-11 DIAGNOSIS — E78.00 PURE HYPERCHOLESTEROLEMIA, UNSPECIFIED: ICD-10-CM

## 2023-05-11 RX ORDER — ATORVASTATIN CALCIUM 10 MG/1
TABLET, FILM COATED ORAL
Qty: 90 TABLET | Refills: 1 | Status: SHIPPED | OUTPATIENT
Start: 2023-05-11

## 2023-05-12 LAB
ALBUMIN SERPL-MCNC: 4.7 G/DL (ref 4–5)
ALBUMIN/GLOB SERPL: 2 {RATIO} (ref 1.2–2.2)
ALP SERPL-CCNC: 95 IU/L (ref 44–121)
ALT SERPL-CCNC: 43 IU/L (ref 0–44)
AST SERPL-CCNC: 41 IU/L (ref 0–40)
BILIRUB SERPL-MCNC: 0.5 MG/DL (ref 0–1.2)
BUN SERPL-MCNC: 25 MG/DL (ref 6–24)
BUN/CREAT SERPL: 23 (ref 9–20)
CALCIUM SERPL-MCNC: 10.2 MG/DL (ref 8.7–10.2)
CHLORIDE SERPL-SCNC: 102 MMOL/L (ref 96–106)
CHOLEST SERPL-MCNC: 124 MG/DL (ref 100–199)
CO2 SERPL-SCNC: 24 MMOL/L (ref 20–29)
CREAT SERPL-MCNC: 1.07 MG/DL (ref 0.76–1.27)
EGFRCR SERPLBLD CKD-EPI 2021: 86 ML/MIN/1.73
GLOBULIN SER CALC-MCNC: 2.3 G/DL (ref 1.5–4.5)
GLUCOSE SERPL-MCNC: 125 MG/DL (ref 70–99)
HBA1C MFR BLD: 6.5 % (ref 4.8–5.6)
HDLC SERPL-MCNC: 64 MG/DL
LDLC SERPL CALC-MCNC: 47 MG/DL (ref 0–99)
POTASSIUM SERPL-SCNC: 5.6 MMOL/L (ref 3.5–5.2)
PROT SERPL-MCNC: 7 G/DL (ref 6–8.5)
SODIUM SERPL-SCNC: 141 MMOL/L (ref 134–144)
TRIGL SERPL-MCNC: 63 MG/DL (ref 0–149)
VLDLC SERPL CALC-MCNC: 13 MG/DL (ref 5–40)

## 2023-05-15 ENCOUNTER — OFFICE VISIT (OUTPATIENT)
Age: 48
End: 2023-05-15
Payer: COMMERCIAL

## 2023-05-15 VITALS
BODY MASS INDEX: 28.64 KG/M2 | OXYGEN SATURATION: 99 % | HEART RATE: 62 BPM | DIASTOLIC BLOOD PRESSURE: 76 MMHG | HEIGHT: 69 IN | WEIGHT: 193.4 LBS | TEMPERATURE: 97.6 F | SYSTOLIC BLOOD PRESSURE: 125 MMHG | RESPIRATION RATE: 20 BRPM

## 2023-05-15 DIAGNOSIS — E78.00 PURE HYPERCHOLESTEROLEMIA, UNSPECIFIED: ICD-10-CM

## 2023-05-15 DIAGNOSIS — E11.65 TYPE 2 DIABETES MELLITUS WITH HYPERGLYCEMIA, WITHOUT LONG-TERM CURRENT USE OF INSULIN (HCC): Primary | ICD-10-CM

## 2023-05-15 PROCEDURE — 3044F HG A1C LEVEL LT 7.0%: CPT | Performed by: INTERNAL MEDICINE

## 2023-05-15 PROCEDURE — 99214 OFFICE O/P EST MOD 30 MIN: CPT | Performed by: INTERNAL MEDICINE

## 2023-05-15 RX ORDER — KETOCONAZOLE 20 MG/G
CREAM TOPICAL DAILY
COMMUNITY

## 2023-05-15 SDOH — ECONOMIC STABILITY: FOOD INSECURITY: WITHIN THE PAST 12 MONTHS, THE FOOD YOU BOUGHT JUST DIDN'T LAST AND YOU DIDN'T HAVE MONEY TO GET MORE.: NEVER TRUE

## 2023-05-15 SDOH — ECONOMIC STABILITY: FOOD INSECURITY: WITHIN THE PAST 12 MONTHS, YOU WORRIED THAT YOUR FOOD WOULD RUN OUT BEFORE YOU GOT MONEY TO BUY MORE.: NEVER TRUE

## 2023-05-15 SDOH — ECONOMIC STABILITY: HOUSING INSECURITY
IN THE LAST 12 MONTHS, WAS THERE A TIME WHEN YOU DID NOT HAVE A STEADY PLACE TO SLEEP OR SLEPT IN A SHELTER (INCLUDING NOW)?: NO

## 2023-05-15 SDOH — ECONOMIC STABILITY: INCOME INSECURITY: HOW HARD IS IT FOR YOU TO PAY FOR THE VERY BASICS LIKE FOOD, HOUSING, MEDICAL CARE, AND HEATING?: NOT VERY HARD

## 2023-05-15 ASSESSMENT — PATIENT HEALTH QUESTIONNAIRE - PHQ9
SUM OF ALL RESPONSES TO PHQ9 QUESTIONS 1 & 2: 0
SUM OF ALL RESPONSES TO PHQ QUESTIONS 1-9: 0
2. FEELING DOWN, DEPRESSED OR HOPELESS: 0
1. LITTLE INTEREST OR PLEASURE IN DOING THINGS: 0

## 2023-05-15 ASSESSMENT — ENCOUNTER SYMPTOMS
GASTROINTESTINAL NEGATIVE: 1
RESPIRATORY NEGATIVE: 1

## 2023-05-15 NOTE — PROGRESS NOTES
Follow Up Visit    Boogie Macias is a 49 y.o. male. he presents for Hyperlipidemia and Diabetes    Endocrine Review  He is seen for diabetes. Since last visit: he has been stable on medications. Home glucose monitoring: is performed regularly, the patient is a distance runner. We had a long discussion about how his sugars are affected by his activity. Also discussed his blood sugar trending in general.  Noted morning hyperglycemia which quickly resolves. Possible cortisol effect as well as exercise and relatively carb heavy breakfast.   He reports medication compliance: compliant all of the time. He reports the following medication side effects: none. Diabetic diet compliance: compliant all of the time. Further diabetic ROS: no chest pain, dyspnea or TIA's, no numbness, tingling or pain in extremities, no medication side effects noted. Lab review: labs are reviewed, up to date and normal, labs reviewed and discussed with patient. Eye exam: has been done. Cardiovascular Review  The patient has hyperlipidemia. He reports taking medications as instructed, no medication side effects noted. Diet and Lifestyle: generally follows a low fat low cholesterol diet, generally follows a low sodium diet, exercises regularly, nonsmoker. Lab review: labs are reviewed, up to date and normal.        Patient Active Problem List   Diagnosis    GERD (gastroesophageal reflux disease)    Hypercholesteremia    Allergic rhinitis         Prior to Admission medications    Medication Sig Start Date End Date Taking? Authorizing Provider   ketoconazole (NIZORAL) 2 % cream Apply topically daily Apply topically daily.    Yes Historical Provider, MD   atorvastatin (LIPITOR) 10 MG tablet TAKE 1 TABLET BY MOUTH EVERY DAY 5/11/23  Yes Isacc Shepard MD   vitamin D (CHOLECALCIFEROL) 25 MCG (1000 UT) TABS tablet Take by mouth daily   Yes Ar Automatic Reconciliation   diclofenac sodium (VOLTAREN) 1 % GEL Apply topically 4 times

## 2023-05-23 DIAGNOSIS — J30.1 ALLERGIC RHINITIS DUE TO POLLEN: ICD-10-CM

## 2023-05-24 RX ORDER — MONTELUKAST SODIUM 10 MG/1
TABLET ORAL
Qty: 90 TABLET | Refills: 0 | Status: SHIPPED | OUTPATIENT
Start: 2023-05-24

## 2023-05-30 DIAGNOSIS — E11.65 TYPE 2 DIABETES MELLITUS WITH HYPERGLYCEMIA (HCC): ICD-10-CM

## 2023-05-31 RX ORDER — METFORMIN HYDROCHLORIDE 500 MG/1
TABLET, EXTENDED RELEASE ORAL
Qty: 360 TABLET | Refills: 1 | Status: SHIPPED | OUTPATIENT
Start: 2023-05-31

## 2023-07-31 DIAGNOSIS — Z00.00 ENCOUNTER FOR GENERAL ADULT MEDICAL EXAMINATION WITHOUT ABNORMAL FINDINGS: ICD-10-CM

## 2023-08-01 RX ORDER — FLASH GLUCOSE SENSOR
KIT MISCELLANEOUS
Qty: 2 EACH | Refills: 3 | Status: SHIPPED | OUTPATIENT
Start: 2023-08-01

## 2023-08-25 DIAGNOSIS — J30.1 ALLERGIC RHINITIS DUE TO POLLEN: ICD-10-CM

## 2023-08-25 RX ORDER — MONTELUKAST SODIUM 10 MG/1
TABLET ORAL
Qty: 90 TABLET | Refills: 0 | Status: SHIPPED | OUTPATIENT
Start: 2023-08-25

## 2023-10-17 ENCOUNTER — TELEPHONE (OUTPATIENT)
Age: 48
End: 2023-10-17

## 2023-10-17 NOTE — TELEPHONE ENCOUNTER
Reason for call: Spoke with pt. Pt has an edith with Dr. [facility] Friday 24/2023 at 3 pm.  He would like to do his lab before the edith.  Please adv pt if this is possible before edith with [facility]    Is this a new problem: Yes    Date of last appointment:  5/15/2023     Can we respond via [employer]: No    Best call back number:    Sherren Sharps  [phone]

## 2023-10-17 NOTE — TELEPHONE ENCOUNTER
----- Message from Jose Foley sent at 10/17/2023 10:36 AM EDT -----  Subject: Referral Request    Reason for referral request? Pt requested orders for routine lab work to   complete before his next routine apt scheduled for 10/30/23. Provider patient wants to be referred to(if known):     Provider Phone Number(if known): Additional Information for Provider?  Pt requested his orders be sent to   [employer].   ---------------------------------------------------------------------------  --------------  [address]    [phone]; OK to leave message on voicemail  ---------------------------------------------------------------------------  --------------

## 2023-10-19 DIAGNOSIS — E11.65 TYPE 2 DIABETES MELLITUS WITH HYPERGLYCEMIA, UNSPECIFIED WHETHER LONG TERM INSULIN USE (HCC): Primary | ICD-10-CM

## 2023-10-24 ENCOUNTER — OFFICE VISIT (OUTPATIENT)
Age: 48
End: 2023-10-24
Payer: COMMERCIAL

## 2023-10-24 VITALS
OXYGEN SATURATION: 97 % | TEMPERATURE: 98 F | DIASTOLIC BLOOD PRESSURE: 74 MMHG | HEIGHT: 69 IN | HEART RATE: 81 BPM | BODY MASS INDEX: 27.76 KG/M2 | SYSTOLIC BLOOD PRESSURE: 115 MMHG | RESPIRATION RATE: 16 BRPM | WEIGHT: 187.4 LBS

## 2023-10-24 DIAGNOSIS — E11.9 TYPE 2 DIABETES MELLITUS WITHOUT COMPLICATION, UNSPECIFIED WHETHER LONG TERM INSULIN USE (HCC): Primary | ICD-10-CM

## 2023-10-24 DIAGNOSIS — E78.5 HYPERLIPIDEMIA, UNSPECIFIED HYPERLIPIDEMIA TYPE: ICD-10-CM

## 2023-10-24 DIAGNOSIS — J30.9 ALLERGIC RHINITIS, UNSPECIFIED SEASONALITY, UNSPECIFIED TRIGGER: ICD-10-CM

## 2023-10-24 PROCEDURE — 99213 OFFICE O/P EST LOW 20 MIN: CPT | Performed by: STUDENT IN AN ORGANIZED HEALTH CARE EDUCATION/TRAINING PROGRAM

## 2023-10-24 PROCEDURE — 3044F HG A1C LEVEL LT 7.0%: CPT | Performed by: STUDENT IN AN ORGANIZED HEALTH CARE EDUCATION/TRAINING PROGRAM

## 2023-10-24 SDOH — ECONOMIC STABILITY: FOOD INSECURITY: WITHIN THE PAST 12 MONTHS, YOU WORRIED THAT YOUR FOOD WOULD RUN OUT BEFORE YOU GOT MONEY TO BUY MORE.: NEVER TRUE

## 2023-10-24 SDOH — ECONOMIC STABILITY: FOOD INSECURITY: WITHIN THE PAST 12 MONTHS, THE FOOD YOU BOUGHT JUST DIDN'T LAST AND YOU DIDN'T HAVE MONEY TO GET MORE.: NEVER TRUE

## 2023-10-24 SDOH — ECONOMIC STABILITY: INCOME INSECURITY: HOW HARD IS IT FOR YOU TO PAY FOR THE VERY BASICS LIKE FOOD, HOUSING, MEDICAL CARE, AND HEATING?: NOT HARD AT ALL

## 2023-10-24 ASSESSMENT — PATIENT HEALTH QUESTIONNAIRE - PHQ9
SUM OF ALL RESPONSES TO PHQ QUESTIONS 1-9: 0
SUM OF ALL RESPONSES TO PHQ9 QUESTIONS 1 & 2: 0
1. LITTLE INTEREST OR PLEASURE IN DOING THINGS: 0
SUM OF ALL RESPONSES TO PHQ QUESTIONS 1-9: 0
2. FEELING DOWN, DEPRESSED OR HOPELESS: 0
SUM OF ALL RESPONSES TO PHQ QUESTIONS 1-9: 0
SUM OF ALL RESPONSES TO PHQ QUESTIONS 1-9: 0

## 2023-10-24 ASSESSMENT — ENCOUNTER SYMPTOMS
ABDOMINAL PAIN: 0
EYE PAIN: 0
DIARRHEA: 0
COLOR CHANGE: 0
WHEEZING: 0
VOMITING: 0
COUGH: 0
SHORTNESS OF BREATH: 0
BACK PAIN: 0
CONSTIPATION: 0
NAUSEA: 0
RHINORRHEA: 0
CHEST TIGHTNESS: 0

## 2023-10-24 NOTE — ASSESSMENT & PLAN NOTE
Check A1c today, expect it may be a bit higher than six months ago since he has not been able to exercise as regularly. Will plan to see every six months for DM followup/labs.      Cont metformin and ozempic

## 2023-11-02 DIAGNOSIS — E11.65 TYPE 2 DIABETES MELLITUS WITH HYPERGLYCEMIA, UNSPECIFIED WHETHER LONG TERM INSULIN USE (HCC): ICD-10-CM

## 2023-11-02 DIAGNOSIS — E11.9 TYPE 2 DIABETES MELLITUS WITHOUT COMPLICATION, UNSPECIFIED WHETHER LONG TERM INSULIN USE (HCC): ICD-10-CM

## 2023-11-03 LAB
ALBUMIN SERPL-MCNC: 3.8 G/DL (ref 3.5–5)
ALBUMIN/GLOB SERPL: 1.3 (ref 1.1–2.2)
ALP SERPL-CCNC: 102 U/L (ref 45–117)
ALT SERPL-CCNC: 50 U/L (ref 12–78)
ANION GAP SERPL CALC-SCNC: 4 MMOL/L (ref 5–15)
AST SERPL-CCNC: 31 U/L (ref 15–37)
BASOPHILS # BLD: 0 K/UL (ref 0–0.1)
BASOPHILS NFR BLD: 0 % (ref 0–1)
BILIRUB SERPL-MCNC: 0.6 MG/DL (ref 0.2–1)
BUN SERPL-MCNC: 21 MG/DL (ref 6–20)
BUN/CREAT SERPL: 18 (ref 12–20)
CALCIUM SERPL-MCNC: 9.1 MG/DL (ref 8.5–10.1)
CHLORIDE SERPL-SCNC: 104 MMOL/L (ref 97–108)
CO2 SERPL-SCNC: 29 MMOL/L (ref 21–32)
CREAT SERPL-MCNC: 1.15 MG/DL (ref 0.7–1.3)
CREAT UR-MCNC: 208 MG/DL
DIFFERENTIAL METHOD BLD: NORMAL
EOSINOPHIL # BLD: 0.1 K/UL (ref 0–0.4)
EOSINOPHIL NFR BLD: 1 % (ref 0–7)
ERYTHROCYTE [DISTWIDTH] IN BLOOD BY AUTOMATED COUNT: 11.9 % (ref 11.5–14.5)
EST. AVERAGE GLUCOSE BLD GHB EST-MCNC: 137 MG/DL
GLOBULIN SER CALC-MCNC: 3 G/DL (ref 2–4)
GLUCOSE SERPL-MCNC: 142 MG/DL (ref 65–100)
HBA1C MFR BLD: 6.4 % (ref 4–5.6)
HCT VFR BLD AUTO: 44.6 % (ref 36.6–50.3)
HGB BLD-MCNC: 15.1 G/DL (ref 12.1–17)
IMM GRANULOCYTES # BLD AUTO: 0 K/UL (ref 0–0.04)
IMM GRANULOCYTES NFR BLD AUTO: 0 % (ref 0–0.5)
LYMPHOCYTES # BLD: 2.7 K/UL (ref 0.8–3.5)
LYMPHOCYTES NFR BLD: 38 % (ref 12–49)
MCH RBC QN AUTO: 30.1 PG (ref 26–34)
MCHC RBC AUTO-ENTMCNC: 33.9 G/DL (ref 30–36.5)
MCV RBC AUTO: 88.8 FL (ref 80–99)
MICROALBUMIN UR-MCNC: 1.26 MG/DL
MICROALBUMIN/CREAT UR-RTO: 6 MG/G (ref 0–30)
MONOCYTES # BLD: 0.6 K/UL (ref 0–1)
MONOCYTES NFR BLD: 9 % (ref 5–13)
NEUTS SEG # BLD: 3.7 K/UL (ref 1.8–8)
NEUTS SEG NFR BLD: 52 % (ref 32–75)
NRBC # BLD: 0 K/UL (ref 0–0.01)
NRBC BLD-RTO: 0 PER 100 WBC
PLATELET # BLD AUTO: 175 K/UL (ref 150–400)
PMV BLD AUTO: 11.1 FL (ref 8.9–12.9)
POTASSIUM SERPL-SCNC: 4.5 MMOL/L (ref 3.5–5.1)
PROT SERPL-MCNC: 6.8 G/DL (ref 6.4–8.2)
RBC # BLD AUTO: 5.02 M/UL (ref 4.1–5.7)
SODIUM SERPL-SCNC: 137 MMOL/L (ref 136–145)
WBC # BLD AUTO: 7.1 K/UL (ref 4.1–11.1)

## 2023-11-27 DIAGNOSIS — J30.1 ALLERGIC RHINITIS DUE TO POLLEN: ICD-10-CM

## 2023-11-27 DIAGNOSIS — E78.00 PURE HYPERCHOLESTEROLEMIA, UNSPECIFIED: ICD-10-CM

## 2023-11-27 DIAGNOSIS — E11.65 TYPE 2 DIABETES MELLITUS WITH HYPERGLYCEMIA (HCC): ICD-10-CM

## 2023-11-27 RX ORDER — ATORVASTATIN CALCIUM 10 MG/1
10 TABLET, FILM COATED ORAL DAILY
Qty: 90 TABLET | Refills: 1 | Status: SHIPPED | OUTPATIENT
Start: 2023-11-27

## 2023-11-27 RX ORDER — MONTELUKAST SODIUM 10 MG/1
10 TABLET ORAL DAILY
Qty: 90 TABLET | Refills: 1 | Status: SHIPPED | OUTPATIENT
Start: 2023-11-27

## 2023-11-27 RX ORDER — METFORMIN HYDROCHLORIDE 500 MG/1
2000 TABLET, EXTENDED RELEASE ORAL DAILY
Qty: 360 TABLET | Refills: 1 | Status: SHIPPED | OUTPATIENT
Start: 2023-11-27

## 2023-12-04 DIAGNOSIS — Z00.00 ENCOUNTER FOR GENERAL ADULT MEDICAL EXAMINATION WITHOUT ABNORMAL FINDINGS: ICD-10-CM

## 2023-12-04 RX ORDER — FLASH GLUCOSE SENSOR
KIT MISCELLANEOUS
Qty: 2 EACH | Refills: 5 | Status: SHIPPED | OUTPATIENT
Start: 2023-12-04

## 2023-12-06 DIAGNOSIS — E11.65 TYPE 2 DIABETES MELLITUS WITH HYPERGLYCEMIA, WITHOUT LONG-TERM CURRENT USE OF INSULIN (HCC): Primary | ICD-10-CM

## 2023-12-06 RX ORDER — SEMAGLUTIDE 1.34 MG/ML
1 INJECTION, SOLUTION SUBCUTANEOUS
Qty: 3 ML | Refills: 5 | Status: SHIPPED | OUTPATIENT
Start: 2023-12-06

## 2024-05-20 ENCOUNTER — OFFICE VISIT (OUTPATIENT)
Age: 49
End: 2024-05-20
Payer: COMMERCIAL

## 2024-05-20 VITALS
TEMPERATURE: 97.8 F | DIASTOLIC BLOOD PRESSURE: 77 MMHG | HEIGHT: 68 IN | SYSTOLIC BLOOD PRESSURE: 120 MMHG | WEIGHT: 189 LBS | OXYGEN SATURATION: 97 % | HEART RATE: 57 BPM | RESPIRATION RATE: 16 BRPM | BODY MASS INDEX: 28.64 KG/M2

## 2024-05-20 DIAGNOSIS — E11.65 TYPE 2 DIABETES MELLITUS WITH HYPERGLYCEMIA, WITHOUT LONG-TERM CURRENT USE OF INSULIN (HCC): ICD-10-CM

## 2024-05-20 DIAGNOSIS — Z00.00 ADULT GENERAL MEDICAL EXAM: Primary | ICD-10-CM

## 2024-05-20 DIAGNOSIS — E78.5 HYPERLIPIDEMIA, UNSPECIFIED HYPERLIPIDEMIA TYPE: ICD-10-CM

## 2024-05-20 PROCEDURE — 99396 PREV VISIT EST AGE 40-64: CPT | Performed by: STUDENT IN AN ORGANIZED HEALTH CARE EDUCATION/TRAINING PROGRAM

## 2024-05-20 RX ORDER — BLOOD-GLUCOSE SENSOR
EACH MISCELLANEOUS
Qty: 6 EACH | Refills: 3 | Status: SHIPPED | OUTPATIENT
Start: 2024-05-20

## 2024-05-20 RX ORDER — CALCIUM CARBONATE 500(1250)
500 TABLET ORAL DAILY
COMMUNITY

## 2024-05-20 ASSESSMENT — ENCOUNTER SYMPTOMS
NAUSEA: 0
SHORTNESS OF BREATH: 0
BLOOD IN STOOL: 0
DIARRHEA: 0
VOMITING: 0
ABDOMINAL PAIN: 0
COUGH: 0
CONSTIPATION: 0

## 2024-05-20 ASSESSMENT — PATIENT HEALTH QUESTIONNAIRE - PHQ9
SUM OF ALL RESPONSES TO PHQ9 QUESTIONS 1 & 2: 0
SUM OF ALL RESPONSES TO PHQ QUESTIONS 1-9: 0
1. LITTLE INTEREST OR PLEASURE IN DOING THINGS: NOT AT ALL
2. FEELING DOWN, DEPRESSED OR HOPELESS: NOT AT ALL
SUM OF ALL RESPONSES TO PHQ QUESTIONS 1-9: 0

## 2024-05-20 NOTE — PROGRESS NOTES
Verified name and birth date for privacy precautions.   Chart reviewed in preparation for today's visit.     Chief Complaint   Patient presents with    Annual Exam          Health Maintenance Due   Topic    Hepatitis B vaccine (1 of 3 - 3-dose series)    HIV screen     COVID-19 Vaccine (3 - 2023-24 season)    Lipids          Wt Readings from Last 3 Encounters:   05/20/24 85.7 kg (189 lb)   10/24/23 85 kg (187 lb 6.4 oz)   05/15/23 87.7 kg (193 lb 6.4 oz)     Temp Readings from Last 3 Encounters:   05/20/24 97.8 °F (36.6 °C) (Oral)   10/24/23 98 °F (36.7 °C) (Temporal)   05/15/23 97.6 °F (36.4 °C) (Oral)     BP Readings from Last 3 Encounters:   05/20/24 120/77   10/24/23 115/74   05/15/23 125/76     Pulse Readings from Last 3 Encounters:   05/20/24 57   10/24/23 81   05/15/23 62       Social Determinants of Health     Tobacco Use: Low Risk  (5/20/2024)    Patient History     Smoking Tobacco Use: Never     Smokeless Tobacco Use: Never     Passive Exposure: Not on file   Alcohol Use: Not on file   Financial Resource Strain: Low Risk  (10/24/2023)    Overall Financial Resource Strain (CARDIA)     Difficulty of Paying Living Expenses: Not hard at all   Food Insecurity: Not on file (10/24/2023)   Transportation Needs: Unknown (10/24/2023)    PRAPARE - Transportation     Lack of Transportation (Medical): Not on file     Lack of Transportation (Non-Medical): No   Physical Activity: Not on file   Stress: Not on file   Social Connections: Not on file   Intimate Partner Violence: Not on file   Depression: Not at risk (10/24/2023)    PHQ-2     PHQ-2 Score: 0   Housing Stability: Unknown (10/24/2023)    Housing Stability Vital Sign     Unable to Pay for Housing in the Last Year: Not on file     Number of Places Lived in the Last Year: Not on file     Unstable Housing in the Last Year: No   Interpersonal Safety: Not on file   Utilities: Not on file

## 2024-05-20 NOTE — ASSESSMENT & PLAN NOTE
Update labs. DM has been well-controlled.   Cont current medications - metformin and Ozempic  Will also update Freestyle to new version

## 2024-05-20 NOTE — PROGRESS NOTES
Bashir Wick is a 48 y.o. year old male who presents today (05/20/24) for an annual physical exam.      Assessment & Plan:   1. Adult general medical exam  2. Type 2 diabetes mellitus with hyperglycemia, without long-term current use of insulin (HCC)  Assessment & Plan:  Update labs. DM has been well-controlled.   Cont current medications - metformin and Ozempic  Will also update Freestyle to new version  Orders:  -     Comprehensive Metabolic Panel  -     Hemoglobin A1C  -     Lipid Panel  -     Continuous Glucose Sensor (FREESTYLE FIONA 3 SENSOR) MISC; Use 1 each every 14 days as directed, Disp-6 each, R-3Normal  3. Hyperlipidemia, unspecified hyperlipidemia type  Assessment & Plan:  Cont statin, update labs today        Return in about 6 months (around 11/20/2024) for DM Followup.    Subjective:   DMII  - Doing well with BG, has been getting more active now that his leg is better and no longer in a boot  - Still using Freestyle Fiona 2, would like to upgrade to 3      HLD  - Stable on statin      Health Maintenance:   Health Maintenance   Topic Date Due    Hepatitis B vaccine (1 of 3 - 3-dose series) Never done    HIV screen  Never done    COVID-19 Vaccine (3 - 2023-24 season) 09/01/2023    Lipids  05/11/2024    Diabetic foot exam  10/24/2024 (Originally 3/24/2021)    Diabetic retinal exam  07/20/2024    Flu vaccine (Season Ended) 08/01/2024    Depression Screen  10/24/2024    A1C test (Diabetic or Prediabetic)  11/02/2024    Diabetic Alb to Cr ratio (uACR) test  11/02/2024    GFR test (Diabetes, CKD 3-4, OR last GFR 15-59)  11/02/2024    DTaP/Tdap/Td vaccine (3 - Td or Tdap) 03/24/2030    Colorectal Cancer Screen  01/27/2032    Pneumococcal 0-64 years Vaccine  Completed    Hepatitis C screen  Completed    Hepatitis A vaccine  Aged Out    Hib vaccine  Aged Out    Polio vaccine  Aged Out    Meningococcal (ACWY) vaccine  Aged Out     Review of Systems   Constitutional:  Negative for chills and fever.   Respiratory:

## 2024-05-20 NOTE — PATIENT INSTRUCTIONS
Heart-Healthy Diet   Sodium, Fat, and Cholesterol Controlled Diet       What Is a Heart Healthy Diet?   A heart-healthy diet is one that limits sodium , certain types of fat , and cholesterol . This type of diet is recommended for:   People with any form of cardiovascular disease (eg, coronary heart disease , peripheral vascular disease , previous heart attack , previous stroke )   People with risk factors for cardiovascular disease, such as high blood pressure , high cholesterol , or diabetes   Anyone who wants to lower their risk of developing cardiovascular disease   Sodium    Sodium is a mineral found in many foods. In general, most people consume much more sodium than they need. Diets high in sodium can increase blood pressure and lead to edema (water retention). On a heart-healthy diet, you should consume no more than 2,300 mg (milligrams) of sodium per dayabout the amount in one teaspoon of table salt. The foods highest in sodium include table salt (about 50% sodium), processed foods, convenience foods, and preserved foods.   Cholesterol    Cholesterol is a fat-like, waxy substance in your blood. Our bodies make some cholesterol. It is also found in animal products, with the highest amounts in fatty meat, egg yolks, whole milk, cheese, shellfish, and organ meats. On a heart-healthy diet, you should limit your cholesterol intake to less than 200 mg per day.   It is normal and important to have some cholesterol in your bloodstream. But too much cholesterol can cause plaque to build up within your arteries, which can eventually lead to a heart attack or stroke.   The two types of cholesterol that are most commonly referred to are:   Low-density lipoprotein (LDL) cholesterol  Also known as bad cholesterol, this is the cholesterol that tends to build up along your arteries. Bad cholesterol levels are increased by eating fats that are saturated or hydrogenated. Optimal level of this cholesterol is less than 100.

## 2024-05-27 DIAGNOSIS — E78.00 PURE HYPERCHOLESTEROLEMIA, UNSPECIFIED: ICD-10-CM

## 2024-05-28 RX ORDER — ATORVASTATIN CALCIUM 10 MG/1
10 TABLET, FILM COATED ORAL DAILY
Qty: 90 TABLET | Refills: 1 | Status: SHIPPED | OUTPATIENT
Start: 2024-05-28

## 2024-05-28 NOTE — TELEPHONE ENCOUNTER
Pt last seen on 5/20/24. Due to return in 6 months.    Has appt on 11/20/24.    Rx last filled on 11/27/23 #90/1RF.    Rx sent to pharmacy as previously filled and verified by Verbal Order Read Back with provider.

## 2024-06-10 DIAGNOSIS — E11.65 TYPE 2 DIABETES MELLITUS WITH HYPERGLYCEMIA, WITHOUT LONG-TERM CURRENT USE OF INSULIN (HCC): ICD-10-CM

## 2024-06-10 DIAGNOSIS — E11.65 TYPE 2 DIABETES MELLITUS WITH HYPERGLYCEMIA (HCC): ICD-10-CM

## 2024-06-10 DIAGNOSIS — J30.1 ALLERGIC RHINITIS DUE TO POLLEN: ICD-10-CM

## 2024-06-10 NOTE — TELEPHONE ENCOUNTER
Last office visit 5/20/24  NEXT APPT  With Internal Medicine (Clarke Armstrong DO)  11/20/2024 at 9:30 AM

## 2024-06-11 RX ORDER — SEMAGLUTIDE 1.34 MG/ML
1 INJECTION, SOLUTION SUBCUTANEOUS
Qty: 3 ML | Refills: 5 | Status: SHIPPED | OUTPATIENT
Start: 2024-06-11

## 2024-06-11 RX ORDER — METFORMIN HYDROCHLORIDE 500 MG/1
2000 TABLET, EXTENDED RELEASE ORAL DAILY
Qty: 360 TABLET | Refills: 1 | Status: SHIPPED | OUTPATIENT
Start: 2024-06-11

## 2024-06-11 RX ORDER — MONTELUKAST SODIUM 10 MG/1
10 TABLET ORAL DAILY
Qty: 90 TABLET | Refills: 1 | Status: SHIPPED | OUTPATIENT
Start: 2024-06-11

## 2024-06-19 LAB
ALBUMIN SERPL-MCNC: 4.6 G/DL (ref 4.1–5.1)
ALP SERPL-CCNC: 107 IU/L (ref 44–121)
ALT SERPL-CCNC: 37 IU/L (ref 0–44)
AST SERPL-CCNC: 30 IU/L (ref 0–40)
BILIRUB SERPL-MCNC: 0.4 MG/DL (ref 0–1.2)
BUN SERPL-MCNC: 19 MG/DL (ref 6–24)
BUN/CREAT SERPL: 16 (ref 9–20)
CALCIUM SERPL-MCNC: 10.4 MG/DL (ref 8.7–10.2)
CHLORIDE SERPL-SCNC: 102 MMOL/L (ref 96–106)
CHOLEST SERPL-MCNC: 133 MG/DL (ref 100–199)
CO2 SERPL-SCNC: 25 MMOL/L (ref 20–29)
CREAT SERPL-MCNC: 1.2 MG/DL (ref 0.76–1.27)
EGFRCR SERPLBLD CKD-EPI 2021: 74 ML/MIN/1.73
GLOBULIN SER CALC-MCNC: 2.2 G/DL (ref 1.5–4.5)
GLUCOSE SERPL-MCNC: 129 MG/DL (ref 70–99)
HBA1C MFR BLD: 7.2 % (ref 4.8–5.6)
HDLC SERPL-MCNC: 63 MG/DL
LDLC SERPL CALC-MCNC: 51 MG/DL (ref 0–99)
POTASSIUM SERPL-SCNC: 4.8 MMOL/L (ref 3.5–5.2)
PROT SERPL-MCNC: 6.8 G/DL (ref 6–8.5)
SODIUM SERPL-SCNC: 142 MMOL/L (ref 134–144)
TRIGL SERPL-MCNC: 105 MG/DL (ref 0–149)
VLDLC SERPL CALC-MCNC: 19 MG/DL (ref 5–40)

## 2024-11-07 ENCOUNTER — PATIENT MESSAGE (OUTPATIENT)
Age: 49
End: 2024-11-07

## 2024-11-07 DIAGNOSIS — E11.65 TYPE 2 DIABETES MELLITUS WITH HYPERGLYCEMIA, WITHOUT LONG-TERM CURRENT USE OF INSULIN (HCC): Primary | ICD-10-CM

## 2024-11-08 DIAGNOSIS — E11.65 TYPE 2 DIABETES MELLITUS WITH HYPERGLYCEMIA, UNSPECIFIED WHETHER LONG TERM INSULIN USE (HCC): Primary | ICD-10-CM

## 2024-11-20 ENCOUNTER — OFFICE VISIT (OUTPATIENT)
Age: 49
End: 2024-11-20
Payer: COMMERCIAL

## 2024-11-20 VITALS
HEIGHT: 68 IN | HEART RATE: 69 BPM | RESPIRATION RATE: 16 BRPM | TEMPERATURE: 98.4 F | BODY MASS INDEX: 28.95 KG/M2 | OXYGEN SATURATION: 96 % | WEIGHT: 191 LBS | SYSTOLIC BLOOD PRESSURE: 113 MMHG | DIASTOLIC BLOOD PRESSURE: 77 MMHG

## 2024-11-20 DIAGNOSIS — E11.65 TYPE 2 DIABETES MELLITUS WITH HYPERGLYCEMIA, WITHOUT LONG-TERM CURRENT USE OF INSULIN (HCC): Primary | ICD-10-CM

## 2024-11-20 PROCEDURE — 3051F HG A1C>EQUAL 7.0%<8.0%: CPT | Performed by: STUDENT IN AN ORGANIZED HEALTH CARE EDUCATION/TRAINING PROGRAM

## 2024-11-20 PROCEDURE — 99213 OFFICE O/P EST LOW 20 MIN: CPT | Performed by: STUDENT IN AN ORGANIZED HEALTH CARE EDUCATION/TRAINING PROGRAM

## 2024-11-20 SDOH — ECONOMIC STABILITY: INCOME INSECURITY: HOW HARD IS IT FOR YOU TO PAY FOR THE VERY BASICS LIKE FOOD, HOUSING, MEDICAL CARE, AND HEATING?: NOT HARD AT ALL

## 2024-11-20 SDOH — ECONOMIC STABILITY: FOOD INSECURITY: WITHIN THE PAST 12 MONTHS, THE FOOD YOU BOUGHT JUST DIDN'T LAST AND YOU DIDN'T HAVE MONEY TO GET MORE.: NEVER TRUE

## 2024-11-20 SDOH — ECONOMIC STABILITY: FOOD INSECURITY: WITHIN THE PAST 12 MONTHS, YOU WORRIED THAT YOUR FOOD WOULD RUN OUT BEFORE YOU GOT MONEY TO BUY MORE.: NEVER TRUE

## 2024-11-20 NOTE — PROGRESS NOTES
Verified name and birth date for privacy precautions.   Chart reviewed in preparation for today's visit.     Chief Complaint   Patient presents with    Diabetes          Health Maintenance Due   Topic    HIV screen     Hepatitis B vaccine (1 of 3 - 19+ 3-dose series)    Diabetic foot exam     Shingles vaccine (1 of 2)    COVID-19 Vaccine (3 - 2023-24 season)    Diabetic Alb to Cr ratio (uACR) test          Wt Readings from Last 3 Encounters:   11/20/24 86.6 kg (191 lb)   05/20/24 85.7 kg (189 lb)   10/24/23 85 kg (187 lb 6.4 oz)     Temp Readings from Last 3 Encounters:   11/20/24 98.4 °F (36.9 °C)   05/20/24 97.8 °F (36.6 °C) (Oral)   10/24/23 98 °F (36.7 °C) (Temporal)     BP Readings from Last 3 Encounters:   11/20/24 113/77   05/20/24 120/77   10/24/23 115/74     Pulse Readings from Last 3 Encounters:   11/20/24 69   05/20/24 57   10/24/23 81       Social Determinants of Health     Tobacco Use: Low Risk  (11/20/2024)    Patient History     Smoking Tobacco Use: Never     Smokeless Tobacco Use: Never     Passive Exposure: Not on file   Alcohol Use: Not on file   Financial Resource Strain: Low Risk  (11/20/2024)    Overall Financial Resource Strain (CARDIA)     Difficulty of Paying Living Expenses: Not hard at all   Food Insecurity: No Food Insecurity (11/20/2024)    Hunger Vital Sign     Worried About Running Out of Food in the Last Year: Never true     Ran Out of Food in the Last Year: Never true   Transportation Needs: Unknown (11/20/2024)    PRAPARE - Transportation     Lack of Transportation (Medical): Not on file     Lack of Transportation (Non-Medical): No   Physical Activity: Not on file   Stress: Not on file   Social Connections: Not on file   Intimate Partner Violence: Not on file   Depression: Not at risk (5/20/2024)    PHQ-2     PHQ-2 Score: 0   Housing Stability: Unknown (11/20/2024)    Housing Stability Vital Sign     Unable to Pay for Housing in the Last Year: Not on file     Number of Times Moved in 
SOPN Inject 1 mg into the skin every 7 days 6/11/24  Yes Clarke Armstrong DO   atorvastatin (LIPITOR) 10 MG tablet TAKE 1 TABLET BY MOUTH EVERY DAY 5/28/24  Yes Clarke Armstrong DO   calcium carbonate (OSCAL) 500 MG TABS tablet Take 1 tablet by mouth daily   Yes ProviderPayal MD   MAGNESIUM GLYCINATE PO Take by mouth   Yes Payal Johnson MD   ketoconazole (NIZORAL) 2 % cream Apply topically daily Apply topically daily.   Yes ProviderPayal MD   vitamin D (CHOLECALCIFEROL) 25 MCG (1000 UT) TABS tablet Take by mouth daily   Yes Automatic Reconciliation, Ar   diclofenac sodium (VOLTAREN) 1 % GEL Apply topically 4 times daily as needed   Yes Automatic Reconciliation, Ar   levocetirizine (XYZAL) 5 MG tablet Take by mouth   Yes Automatic Reconciliation, Ar   meloxicam (MOBIC) 15 MG tablet As needed 10/28/21  Yes Automatic Reconciliation, Ar   Continuous Glucose Sensor (FREESTYLE FIONA 3 SENSOR) MISC Use 1 each every 14 days as directed 5/20/24   Clarke Armstrong DO   Continuous Blood Gluc Sensor (FREESTYLE FIONA 14 DAY SENSOR) MISC USE AS DIRECTED ON THE PAKAGE ONE SENSOR IS GOOD FOR 14 DAYS, REPLACE AFTER 14 DAYS 12/4/23   Clarke Armstrong DO       The following sections were reviewed & updated as appropriate: Problem List, Allergies, PMH, PSH, FH, and SH.      Objective:   /77   Pulse 69   Temp 98.4 °F (36.9 °C)   Resp 16   Ht 1.734 m (5' 8.25\")   Wt 86.6 kg (191 lb)   SpO2 96%   BMI 28.83 kg/m²     Physical Exam  Constitutional:       General: He is not in acute distress.     Appearance: Normal appearance.   Eyes:      General: No scleral icterus.     Conjunctiva/sclera: Conjunctivae normal.   Pulmonary:      Effort: Pulmonary effort is normal. No respiratory distress.   Skin:     General: Skin is warm and dry.      Findings: No rash.   Neurological:      General: No focal deficit present.      Mental Status: He is alert and oriented to person, place, and time.   Psychiatric:

## 2024-11-25 DIAGNOSIS — E11.65 TYPE 2 DIABETES MELLITUS WITH HYPERGLYCEMIA, WITHOUT LONG-TERM CURRENT USE OF INSULIN (HCC): ICD-10-CM

## 2024-11-26 RX ORDER — SEMAGLUTIDE 1.34 MG/ML
1 INJECTION, SOLUTION SUBCUTANEOUS
Refills: 5 | OUTPATIENT
Start: 2024-11-26

## 2024-11-26 NOTE — TELEPHONE ENCOUNTER
Pt last seen on 11/20/24. Due to return in 6 months.    Has appt on 5/20/25.    Rx last filled on 6/11/24 #3 ml/5RF.    Will forward to MD for refill.

## 2024-11-27 DIAGNOSIS — E11.65 TYPE 2 DIABETES MELLITUS WITH HYPERGLYCEMIA, WITHOUT LONG-TERM CURRENT USE OF INSULIN (HCC): ICD-10-CM

## 2024-11-29 RX ORDER — SEMAGLUTIDE 1.34 MG/ML
1 INJECTION, SOLUTION SUBCUTANEOUS
Qty: 3 ML | Refills: 0 | Status: SHIPPED | OUTPATIENT
Start: 2024-11-29

## 2024-11-29 NOTE — TELEPHONE ENCOUNTER
Last office visit 11/20/24  NEXT APPT  With Internal Medicine (Clarke Armstrong DO)  05/20/2025 at 3:30 PM    Last fill on semaglutide 6/11/24 3ml with 5 additional refills

## 2024-12-01 DIAGNOSIS — E78.00 PURE HYPERCHOLESTEROLEMIA, UNSPECIFIED: ICD-10-CM

## 2024-12-02 DIAGNOSIS — J30.1 ALLERGIC RHINITIS DUE TO POLLEN: ICD-10-CM

## 2024-12-02 DIAGNOSIS — E11.65 TYPE 2 DIABETES MELLITUS WITH HYPERGLYCEMIA (HCC): ICD-10-CM

## 2024-12-02 RX ORDER — ATORVASTATIN CALCIUM 10 MG/1
10 TABLET, FILM COATED ORAL DAILY
Qty: 90 TABLET | Refills: 3 | Status: SHIPPED | OUTPATIENT
Start: 2024-12-02

## 2024-12-02 RX ORDER — MONTELUKAST SODIUM 10 MG/1
10 TABLET ORAL DAILY
Qty: 90 TABLET | Refills: 3 | Status: SHIPPED | OUTPATIENT
Start: 2024-12-02

## 2024-12-02 RX ORDER — METFORMIN HYDROCHLORIDE 500 MG/1
2000 TABLET, EXTENDED RELEASE ORAL DAILY
Qty: 360 TABLET | Refills: 1 | Status: SHIPPED | OUTPATIENT
Start: 2024-12-02

## 2024-12-02 NOTE — TELEPHONE ENCOUNTER
Pt last seen on 11/20/24. Due to return in 6 months.    Has appt on 5/20/25.    Both Rx last filled on 6/11/24.    Will forward to MD for refill.

## 2024-12-02 NOTE — TELEPHONE ENCOUNTER
Pt last seen on 11/20/24. Due to return in 6 months.    Has appt on 5/20/25.    Rx last filled on 5/28/24 #90/1RF.    Will forward to MD for refill.

## 2024-12-04 LAB
ALBUMIN SERPL-MCNC: 4.5 G/DL (ref 4.1–5.1)
ALP SERPL-CCNC: 112 IU/L (ref 44–121)
ALT SERPL-CCNC: 45 IU/L (ref 0–44)
AST SERPL-CCNC: 36 IU/L (ref 0–40)
BILIRUB SERPL-MCNC: 0.4 MG/DL (ref 0–1.2)
BUN SERPL-MCNC: 19 MG/DL (ref 6–24)
BUN/CREAT SERPL: 18 (ref 9–20)
CALCIUM SERPL-MCNC: 10 MG/DL (ref 8.7–10.2)
CHLORIDE SERPL-SCNC: 101 MMOL/L (ref 96–106)
CO2 SERPL-SCNC: 26 MMOL/L (ref 20–29)
CREAT SERPL-MCNC: 1.06 MG/DL (ref 0.76–1.27)
EGFRCR SERPLBLD CKD-EPI 2021: 85 ML/MIN/1.73
GLOBULIN SER CALC-MCNC: 2.4 G/DL (ref 1.5–4.5)
GLUCOSE SERPL-MCNC: 124 MG/DL (ref 70–99)
HBA1C MFR BLD: 7.2 % (ref 4.8–5.6)
POTASSIUM SERPL-SCNC: 5.1 MMOL/L (ref 3.5–5.2)
PROT SERPL-MCNC: 6.9 G/DL (ref 6–8.5)
SODIUM SERPL-SCNC: 141 MMOL/L (ref 134–144)

## 2024-12-23 ENCOUNTER — PATIENT MESSAGE (OUTPATIENT)
Age: 49
End: 2024-12-23

## 2024-12-23 DIAGNOSIS — E11.65 TYPE 2 DIABETES MELLITUS WITH HYPERGLYCEMIA, WITHOUT LONG-TERM CURRENT USE OF INSULIN (HCC): ICD-10-CM

## 2024-12-23 RX ORDER — SEMAGLUTIDE 2.68 MG/ML
2 INJECTION, SOLUTION SUBCUTANEOUS
Qty: 9 ML | Refills: 3 | Status: SHIPPED | OUTPATIENT
Start: 2024-12-23

## 2024-12-23 RX ORDER — SEMAGLUTIDE 1.34 MG/ML
1 INJECTION, SOLUTION SUBCUTANEOUS
Qty: 3 ML | Refills: 0 | Status: CANCELLED | OUTPATIENT
Start: 2024-12-23

## 2024-12-23 NOTE — TELEPHONE ENCOUNTER
Pt last seen on 11/20/24. Due to return in 6 months.    Has appt on 5/20/25.    Rx last filled on 11/29/24 #3 ml/0RF.    Will forward to MD for refill.

## 2025-04-26 NOTE — TELEPHONE ENCOUNTER
[phone] pt calling regarding these refills, he says the he out of these meds and needs this refill asap.
Call- find out how many syringes he gets at a time.   Change benazepril to 90 with no rf
Last Refill: Benazepril- 2/26/17       BASAGLAR 100 UNIT/ML KWIKPEN- 1/6/17      Last Office Visit: 7/3/17    Upcoming Appointment: 10/2/17
lvm for pt to return our call and discuss Basaglar refill request.
Simple: Patient demonstrates quick and easy understanding/Verbalized Understanding

## 2025-05-01 DIAGNOSIS — E11.65 TYPE 2 DIABETES MELLITUS WITH HYPERGLYCEMIA, WITHOUT LONG-TERM CURRENT USE OF INSULIN (HCC): ICD-10-CM

## 2025-05-20 ENCOUNTER — OFFICE VISIT (OUTPATIENT)
Age: 50
End: 2025-05-20
Payer: COMMERCIAL

## 2025-05-20 VITALS — BODY MASS INDEX: 28.73 KG/M2 | WEIGHT: 194 LBS | RESPIRATION RATE: 16 BRPM | HEIGHT: 69 IN | TEMPERATURE: 98.1 F

## 2025-05-20 DIAGNOSIS — G57.01 PIRIFORMIS SYNDROME OF RIGHT SIDE: ICD-10-CM

## 2025-05-20 DIAGNOSIS — E11.65 TYPE 2 DIABETES MELLITUS WITH HYPERGLYCEMIA, WITHOUT LONG-TERM CURRENT USE OF INSULIN (HCC): Primary | ICD-10-CM

## 2025-05-20 DIAGNOSIS — R53.83 FATIGUE, UNSPECIFIED TYPE: ICD-10-CM

## 2025-05-20 DIAGNOSIS — S39.81XA SPORTS HERNIA, INITIAL ENCOUNTER: ICD-10-CM

## 2025-05-20 DIAGNOSIS — L60.0 INGROWN TOENAIL OF RIGHT FOOT: ICD-10-CM

## 2025-05-20 LAB
ALBUMIN SERPL-MCNC: 4.5 G/DL (ref 4.1–5.1)
ALBUMIN/CREAT UR: <2 MG/G CREAT (ref 0–29)
ALP SERPL-CCNC: 95 IU/L (ref 44–121)
ALT SERPL-CCNC: 35 IU/L (ref 0–44)
AST SERPL-CCNC: 30 IU/L (ref 0–40)
BILIRUB SERPL-MCNC: 0.4 MG/DL (ref 0–1.2)
BUN SERPL-MCNC: 19 MG/DL (ref 6–24)
BUN/CREAT SERPL: 18 (ref 9–20)
CALCIUM SERPL-MCNC: 9.9 MG/DL (ref 8.7–10.2)
CHLORIDE SERPL-SCNC: 101 MMOL/L (ref 96–106)
CHOLEST SERPL-MCNC: 117 MG/DL (ref 100–199)
CO2 SERPL-SCNC: 21 MMOL/L (ref 20–29)
CREAT SERPL-MCNC: 1.05 MG/DL (ref 0.76–1.27)
CREAT UR-MCNC: 127.8 MG/DL
EGFRCR SERPLBLD CKD-EPI 2021: 86 ML/MIN/1.73
ERYTHROCYTE [DISTWIDTH] IN BLOOD BY AUTOMATED COUNT: 12.3 % (ref 11.6–15.4)
GLOBULIN SER CALC-MCNC: 1.9 G/DL (ref 1.5–4.5)
GLUCOSE SERPL-MCNC: 133 MG/DL (ref 70–99)
HBA1C MFR BLD: 6.9 % (ref 4.8–5.6)
HCT VFR BLD AUTO: 45.1 % (ref 37.5–51)
HDLC SERPL-MCNC: 59 MG/DL
HGB BLD-MCNC: 15 G/DL (ref 13–17.7)
LDLC SERPL CALC-MCNC: 45 MG/DL (ref 0–99)
MCH RBC QN AUTO: 30.9 PG (ref 26.6–33)
MCHC RBC AUTO-ENTMCNC: 33.3 G/DL (ref 31.5–35.7)
MCV RBC AUTO: 93 FL (ref 79–97)
MICROALBUMIN UR-MCNC: <3 UG/ML
PLATELET # BLD AUTO: 184 X10E3/UL (ref 150–450)
POTASSIUM SERPL-SCNC: 4.6 MMOL/L (ref 3.5–5.2)
PROT SERPL-MCNC: 6.4 G/DL (ref 6–8.5)
RBC # BLD AUTO: 4.86 X10E6/UL (ref 4.14–5.8)
SODIUM SERPL-SCNC: 139 MMOL/L (ref 134–144)
TRIGL SERPL-MCNC: 57 MG/DL (ref 0–149)
VLDLC SERPL CALC-MCNC: 13 MG/DL (ref 5–40)
WBC # BLD AUTO: 7.8 X10E3/UL (ref 3.4–10.8)

## 2025-05-20 PROCEDURE — 3044F HG A1C LEVEL LT 7.0%: CPT | Performed by: STUDENT IN AN ORGANIZED HEALTH CARE EDUCATION/TRAINING PROGRAM

## 2025-05-20 PROCEDURE — 99215 OFFICE O/P EST HI 40 MIN: CPT | Performed by: STUDENT IN AN ORGANIZED HEALTH CARE EDUCATION/TRAINING PROGRAM

## 2025-05-20 RX ORDER — HYDROCHLOROTHIAZIDE 12.5 MG/1
CAPSULE ORAL
Qty: 6 EACH | Refills: 3 | Status: SHIPPED | OUTPATIENT
Start: 2025-05-20

## 2025-05-20 SDOH — ECONOMIC STABILITY: TRANSPORTATION INSECURITY
IN THE PAST 12 MONTHS, HAS LACK OF TRANSPORTATION KEPT YOU FROM MEETINGS, WORK, OR FROM GETTING THINGS NEEDED FOR DAILY LIVING?: NO

## 2025-05-20 SDOH — ECONOMIC STABILITY: FOOD INSECURITY: WITHIN THE PAST 12 MONTHS, THE FOOD YOU BOUGHT JUST DIDN'T LAST AND YOU DIDN'T HAVE MONEY TO GET MORE.: NEVER TRUE

## 2025-05-20 SDOH — ECONOMIC STABILITY: FOOD INSECURITY: WITHIN THE PAST 12 MONTHS, YOU WORRIED THAT YOUR FOOD WOULD RUN OUT BEFORE YOU GOT MONEY TO BUY MORE.: NEVER TRUE

## 2025-05-20 SDOH — ECONOMIC STABILITY: INCOME INSECURITY: IN THE LAST 12 MONTHS, WAS THERE A TIME WHEN YOU WERE NOT ABLE TO PAY THE MORTGAGE OR RENT ON TIME?: NO

## 2025-05-20 SDOH — ECONOMIC STABILITY: TRANSPORTATION INSECURITY
IN THE PAST 12 MONTHS, HAS THE LACK OF TRANSPORTATION KEPT YOU FROM MEDICAL APPOINTMENTS OR FROM GETTING MEDICATIONS?: NO

## 2025-05-20 ASSESSMENT — PATIENT HEALTH QUESTIONNAIRE - PHQ9
2. FEELING DOWN, DEPRESSED OR HOPELESS: NOT AT ALL
1. LITTLE INTEREST OR PLEASURE IN DOING THINGS: NOT AT ALL
SUM OF ALL RESPONSES TO PHQ QUESTIONS 1-9: 0

## 2025-05-20 NOTE — PROGRESS NOTES
Verified name and birth date for privacy precautions.   Chart reviewed in preparation for today's visit.     Chief Complaint   Patient presents with    Diabetes    Abdominal Pain     Lower abdominal jayden x 6 weeks.     Toe Pain     Right large toe pain     Fatigue    request for new glucose meter      Free style jada 3 plus           Health Maintenance Due   Topic    HIV screen     Hepatitis B vaccine (1 of 3 - 19+ 3-dose series)    Diabetic foot exam     Shingles vaccine (1 of 2)    COVID-19 Vaccine (3 - 2024-25 season)    Depression Screen          Wt Readings from Last 3 Encounters:   05/20/25 88 kg (194 lb)   11/20/24 86.6 kg (191 lb)   05/20/24 85.7 kg (189 lb)     Temp Readings from Last 3 Encounters:   05/20/25 98.1 °F (36.7 °C)   11/20/24 98.4 °F (36.9 °C)   05/20/24 97.8 °F (36.6 °C) (Oral)     BP Readings from Last 3 Encounters:   11/20/24 113/77   05/20/24 120/77   10/24/23 115/74     Pulse Readings from Last 3 Encounters:   11/20/24 69   05/20/24 57   10/24/23 81       Social Drivers of Health     Tobacco Use: Low Risk  (5/20/2025)    Patient History     Smoking Tobacco Use: Never     Smokeless Tobacco Use: Never     Passive Exposure: Not on file   Alcohol Use: Not on file   Financial Resource Strain: Low Risk  (11/20/2024)    Overall Financial Resource Strain (CARDIA)     Difficulty of Paying Living Expenses: Not hard at all   Food Insecurity: No Food Insecurity (5/20/2025)    Hunger Vital Sign     Worried About Running Out of Food in the Last Year: Never true     Ran Out of Food in the Last Year: Never true   Transportation Needs: No Transportation Needs (5/20/2025)    PRAPARE - Transportation     Lack of Transportation (Medical): No     Lack of Transportation (Non-Medical): No   Physical Activity: Not on file   Stress: Not on file   Social Connections: Not on file   Intimate Partner Violence: Not on file   Depression: Not at risk (5/20/2025)    PHQ-2     PHQ-2 Score: 0   Housing Stability: Low Risk

## 2025-05-20 NOTE — PROGRESS NOTES
Bashir Wick is a 49 y.o. year old male who presents today (05/20/25) for follow-up/ Diabetes, Abdominal Pain (Lower abdominal jayden x 6 weeks. ), Toe Pain (Right large toe pain ), Fatigue, and request for new glucose meter  (Free style jada 3 plus )      Assessment & Plan:     Assessment & Plan  1. Type 2 diabetes: Stable. A1c 6.9.   - Suspected type 1.5 diabetes due to age at diagnosis and lack of significant A1c reduction despite doubling Ozempic dose.  - Continue Ozempic 2 mg once weekly.  - Consult dietician for suggestions on adjusting macros for diabetes and his running  - Additional tests for C-peptide and insulin levels during next lab work.    2. Fatigue:   - Potential side effect of increased Ozempic dose.  - Collaborate with dietitian to adjust eating patterns.  - If no improvement by next appointment, consider reducing Ozempic dose.    3. Sports hernia.  - Continue stretching exercises.  - Consider physical therapy if symptoms persist or worsen.    4. Right-sided piriformis syndrome.  - Recommended specific stretches to help in recovery.  - Continue stretching exercises.  - Inform if symptoms worsen.    5. Ingrown toenail.  - Follow-up with podiatry recommended.        Follow-up  - Follow up in 6 months or sooner if needed.    Subjective:     History of Present Illness  The patient presents for evaluation of diabetes mellitus, sports hernia, gluteal strain, and ingrown toenail.    Diabetes Mellitus  He reports significant blood sugar fluctuations despite increasing Ozempic. He is a long distance runner and notes in particular fluctuations during/after runs. Glucose spikes up to 250 after eating breakfast despite changing protein content/ratio to carbohydrates. He notes that he was previously on insulin before starting Ozempic and says that there was some question of whether or not he was a type I diabetic since he was diagnosed at a younger age (dx at age 32).    Lethargy  He reports lethargy

## 2025-05-21 ENCOUNTER — TELEPHONE (OUTPATIENT)
Age: 50
End: 2025-05-21

## 2025-05-21 DIAGNOSIS — E11.65 TYPE 2 DIABETES MELLITUS WITH HYPERGLYCEMIA, WITHOUT LONG-TERM CURRENT USE OF INSULIN (HCC): Primary | ICD-10-CM

## 2025-05-21 RX ORDER — ACYCLOVIR 400 MG/1
TABLET ORAL
Qty: 9 EACH | Refills: 3 | Status: SHIPPED | OUTPATIENT
Start: 2025-05-21

## 2025-06-03 DIAGNOSIS — E11.65 TYPE 2 DIABETES MELLITUS WITH HYPERGLYCEMIA (HCC): ICD-10-CM

## 2025-06-03 NOTE — TELEPHONE ENCOUNTER
Pt last seen on 5/20/2025. Due to return in 6 months.    Has appt on 12/9/25.    Rx last filled on 12/2/24 #360/1RF.    Will forward to MD for refill.

## 2025-06-04 RX ORDER — METFORMIN HYDROCHLORIDE 500 MG/1
2000 TABLET, EXTENDED RELEASE ORAL DAILY
Qty: 360 TABLET | Refills: 3 | Status: SHIPPED | OUTPATIENT
Start: 2025-06-04